# Patient Record
Sex: FEMALE | Race: BLACK OR AFRICAN AMERICAN | NOT HISPANIC OR LATINO | Employment: FULL TIME | URBAN - METROPOLITAN AREA
[De-identification: names, ages, dates, MRNs, and addresses within clinical notes are randomized per-mention and may not be internally consistent; named-entity substitution may affect disease eponyms.]

---

## 2017-05-16 ENCOUNTER — HOSPITAL ENCOUNTER (EMERGENCY)
Facility: CLINIC | Age: 22
Discharge: HOME | End: 2017-05-17
Attending: EMERGENCY MEDICINE | Admitting: EMERGENCY MEDICINE
Payer: COMMERCIAL

## 2017-05-16 VITALS
TEMPERATURE: 98.3 F | BODY MASS INDEX: 18.85 KG/M2 | DIASTOLIC BLOOD PRESSURE: 49 MMHG | HEIGHT: 60 IN | RESPIRATION RATE: 16 BRPM | WEIGHT: 96 LBS | OXYGEN SATURATION: 100 % | SYSTOLIC BLOOD PRESSURE: 96 MMHG | HEART RATE: 78 BPM

## 2017-05-16 DIAGNOSIS — Z3A.01 LESS THAN 8 WEEKS GESTATION OF PREGNANCY: ICD-10-CM

## 2017-05-16 DIAGNOSIS — O21.9 NAUSEA AND VOMITING DURING PREGNANCY: ICD-10-CM

## 2017-05-16 PROCEDURE — 96360 HYDRATION IV INFUSION INIT: CPT

## 2017-05-16 PROCEDURE — 99284 EMERGENCY DEPT VISIT MOD MDM: CPT | Mod: 25

## 2017-05-16 PROCEDURE — 80048 BASIC METABOLIC PNL TOTAL CA: CPT | Performed by: EMERGENCY MEDICINE

## 2017-05-16 PROCEDURE — 84702 CHORIONIC GONADOTROPIN TEST: CPT | Performed by: EMERGENCY MEDICINE

## 2017-05-16 NOTE — ED AVS SNAPSHOT
"  Emergency Department    [address] [facility] [address]    Phone:  [phone]    Fax:  [phone]                                       Anna Marie Knight   MRN: [id number]    Department:   Emergency Department   Date of Visit:  5/16/2017           Patient Information     Date Of Birth          1995        Your diagnoses for this visit were:     Less than 8 weeks gestation of pregnancy     Nausea and vomiting during pregnancy        You were seen by Gregorio Garcia MD.      Follow-up Information     Follow up with Your ob/gyn clinic In 2 days.    Why:  Thursday as scheduled        Follow up with  Emergency Department.    Specialty:  EMERGENCY MEDICINE    Why:  If symptoms worsen    Contact information:    [address] [facility] [address] [phone]        Discharge Instructions           How to Control Nausea and Vomiting     Taken before meals, medicines can help ease nausea.    Nausea is feeling that you need to throw up. Throwing up occurs when your body forces food that is in your stomach out through your mouth. Nausea and vomiting are symptoms that are caused by many things. They can happen when a condition or disease, medicine, medical treatment, or a poisonous substance affects the area in your brain that controls vomiting. Some conditions or diseases can cause nausea, abdominal pain or cramps, and vomiting. The symptoms can be mild and go away by themselves. Other symptoms can be serious. You will need to see your healthcare provider for these.  Nausea and vomiting are common. They can be caused by many things. These include:    \"Stomach flu\" (gastroenteritis)    Food poisoning    Stomach pain (gastritis)    Blockages  They can also be caused by a head injury, an infection in the brain or inside the ear, or migraines. Other common causes of nausea and vomiting include:    Brain tumor    Brain bruise    Motion sickness    Drugs. These include alcohol, " pain medicines such as morphine, and cancer medicines.    Toxins. These are poisonous things like plants or liquids that are swallowed by accident.    Advanced types of cancer    Movement problems (psychogenic problems)    Extra pressure in the fluid that surrounds the brain and spinal cord (elevated intracranial pressure)     Nausea and vomiting are also common side effects of chemotherapy and radiation therapy. Side effects happen when treatment changes some normal cells as well as cancer cells. In this case, the cells lining your stomach and the part of your brain that controls vomiting are affected. Other more serious causes of vomiting may be hard to find early in the illness.     When to seek medical advice  Call your healthcare provider right away if you have the following:    Nausea or vomiting that lasts 24 hours or more    Trouble keeping fluids down   Medicines can help  Nausea or vomiting can often be prevented or controlled with medicines (antiemetics). Your doctor may give you antiemetics before or after treatment if you are getting chemotherapy or other medical treatments that cause nausea or vomiting.  Eating tips    If you have medicines to control nausea, take them before meals as directed.    Avoid fatty or greasy foods while nauseated.    Eat small meals slowly throughout the day.    Ask someone to sit with you while you eat to keep you from thinking about feeling nauseated.    Eat foods at room temperature or colder to avoid strong smells.    Eat dry foods, such as toast, crackers, or pretzels. Also eat cool, light foods, such as applesauce, and bland foods, such as oatmeal or skinned chicken.   Other ways to feel better    Get a little fresh air. Take a short walk.    Talk to a friend, listen to music, or watch TV.    Take a few deep, slow breaths.    Eat by candlelight or in surroundings that you find relaxing.    Use a technique, such as guided imagery, to help you relax. Imagine yourself in  a beautiful, restful scene. Or daydream about the place you d most like to be.    0920-6497 [employer]. [address] [place] [address], [place] [address]. All rights reserved. This information is not intended as a substitute for professional medical care. Always follow your healthcare professional's instructions.        Adapting to Pregnancy: First Trimester  As your body adjusts, you may have to change or limit your daily activities. You ll need more rest. You may also need to use the energy you have more wisely.     Eat stomach-friendly foods like cottage cheese, crackers, or bread throughout the day.   Your changing body  Almost every part of your body is affected as you adapt to pregnancy. The uterus and cervix will begin to soften right away. You may not look very pregnant during the first 3 months. But you are likely to have some common signs of early pregnancy:    Nausea    Fatigue    Frequent urination    Mood swings    Bloating of the abdomen    Missed or light periods (first trimester bleeding)    Nipple or breast tenderness, breast swelling  It s not too late to start good habits  What matters most is protecting your baby from this moment on. If you smoke, drink alcohol, or use drugs, now is the time to stop. If you need help, talk with your healthcare provider.    Smoking increases the risk of  stillbirth or having a low-birth-weight baby. If you smoke, quit now.    Alcohol and drugs have been linked with miscarriage, birth defects, intellectual disability, and low birth weight. Do not drink alcohol or take drugs.  Tips to relieve nausea  Although nausea can happen at any time of the day, it may be worse in the morning. To help prevent nausea:    Eat small, light meals at frequent intervals.    Get up slowly. Eat a few unsalted crackers before you get out of bed.    Avoid smells that bother you.    Avoid spicy and fatty foods.    Eat an ice pop in your favorite flavor.    Get plenty of  rest.    Ask your healthcare provider about taking jeaneth or vitamin B6 for nausea and vomiting.    Talk with your healthcare provider if you take vitamins that upset your stomach.  Work concerns  The end of the first trimester is a good time to discuss working during pregnancy with your employer. Follow your healthcare provider s advice if your job requires you to stand for a long time, work with hazardous tools, or even sit at a desk all day. Your workspace, workload, or scheduled hours may need to be adjusted. Perhaps you can change body postures more often or take an extra break.  Advice for travel  Talk to your healthcare provider first, but the second trimester may be the best time for any travel. You may be advised to avoid certain trips while you re pregnant. Food and water can be concerns in developing countries. Travel by car is a good choice, as you can stop, get out, and stretch. Bring snacks and water along. Fasten the lap belt below your belly, low over your hips. Also be sure to wear the shoulder harness.  Intimacy  Unless your healthcare provider tells you to, there is no reason to stop having sex while you re pregnant. You or your partner may notice changes in desire. Desire may be less in the first trimester, due to nausea and fatigue. In the second trimester, sex may be very enjoyable. The third trimester can be a challenge comfort-wise. Try different positions and see what s best for you both.    5877-6505 [employer]. [address] [place] [address]. All rights reserved. This information is not intended as a substitute for professional medical care. Always follow your healthcare professional's instructions.          Pregnancy: Your First Trimester Changes  The first trimester is a time of rapid development for your baby. Because your baby is growing so quickly, it is important that you start a healthy lifestyle right away. By the end of the first trimester, your baby has  "formed all of its major body organs and weighs just over an ounce.     Actual size of baby is 1/4\"    Month 1 (Weeks 1 to 4)  The placenta (the organ that nourishes your baby) begins to form. The brain, spinal cord, heart, gastrointestinal tract, and lungs begin to develop. Your baby is about 1/4 inch long by the end of the first month.     Actual size of baby is 1\"    Month 2 (Weeks 5 to 8)  All of your baby s major body organs form. The face, fingers, toes, ears, and eyes appear. By the end of the month, your baby is about 1-inch long.     Actual size of baby is 4\"    Month 3 (Weeks 9 to 12)  Your baby can open and close its fists and mouth. The sexual organs begin to form. As the first trimester ends, your baby is about 3-inches long.    8348-3398 [employer]. [address] [place] [address]. All rights reserved. This information is not intended as a substitute for professional medical care. Always follow your healthcare professional's instructions.          24 Hour Appointment Hotline       To make an appointment at any [facility], call [phone] ([phone]). If you don't have a family doctor or clinic, we will help you find one. [facility] clinics are conveniently located to serve the needs of you and your family.             Review of your medicines      START taking        Dose / Directions Last dose taken    B-6 50 MG Tabs   Dose:  25 mg   Quantity:  15 tablet        Take 25 mg by mouth 3 times daily as needed   Refills:  0        doxylamine 25 MG Tabs tablet   Commonly known as:  UNISOM   Dose:  25 mg   Quantity:  15 each        Take 1 tablet (25 mg) by mouth 3 times daily as needed   Refills:  0                Prescriptions were sent or printed at these locations (2 Prescriptions)                   Other Prescriptions                Printed at Department/Unit printer (2 of 2)         doxylamine (UNISOM) 25 MG TABS tablet               Pyridoxine HCl (B-6) 50 MG TABS    "             Procedures and tests performed during your visit     Basic metabolic panel    HCG quantitative pregnancy (blood)    US OB < 14 Weeks Single      Orders Needing Specimen Collection     None      Pending Results     No orders found for last 3 day(s).            Pending Culture Results     No orders found for last 3 day(s).            Pending Results Instructions     If you had any lab results that were not finalized at the time of your Discharge, you can call the ED Lab Result RN at [phone]. You will be contacted by this team for any positive Lab results or changes in treatment. The nurses are available 7 days a week from 10A to 6:30P.  You can leave a message 24 hours per day and they will return your call.        Test Results From Your Hospital Stay        5/17/2017  1:27 AM      Component Results     Component Value Ref Range & Units Status    Sodium 136 133 - 144 mmol/L Final    Potassium 3.5 3.4 - 5.3 mmol/L Final    Chloride 102 94 - 109 mmol/L Final    Carbon Dioxide 21 20 - 32 mmol/L Final    Anion Gap 13 3 - 14 mmol/L Final    Glucose 70 70 - 99 mg/dL Final    Urea Nitrogen 7 7 - 30 mg/dL Final    Creatinine 0.44 (L) 0.52 - 1.04 mg/dL Final    GFR Estimate >90  Non  GFR Calc   >60 mL/min/1.7m2 Final    GFR Estimate If Black >90   GFR Calc   >60 mL/min/1.7m2 Final    Calcium 8.4 (L) 8.5 - 10.1 mg/dL Final         5/17/2017  1:46 AM      Component Results     Component Value Ref Range & Units Status    HCG Quantitative Serum 66952 (H) 0 - 5 IU/L Final               5/17/2017  2:48 AM      Narrative     US OB < 14 WEEKS SINGLE  5/17/2017 2:17 AM     INDICATION: Pelvic pain in pregnancy.    COMPARISON: None.    FINDINGS: Transabdominal ultrasound demonstrates an early live IUP  measuring 7 weeks and 2 days by crown-rump length. Fetal heart rate  143 bpm. The GILSON by crown-rump length is 1/1/2018. Ovaries are not  visualized. No free fluid or other acute findings.         Impression     IMPRESSION : Single early live IUP 7 weeks and 2 days. No abnormality  is seen.    YOEL BRAXTON MD                Clinical Quality Measure: Blood Pressure Screening     Your blood pressure was checked while you were in the emergency department today. The last reading we obtained was  BP: 96/49 . Please read the guidelines below about what these numbers mean and what you should do about them.  If your systolic blood pressure (the top number) is less than 120 and your diastolic blood pressure (the bottom number) is less than 80, then your blood pressure is normal. There is nothing more that you need to do about it.  If your systolic blood pressure (the top number) is 120-139 or your diastolic blood pressure (the bottom number) is 80-89, your blood pressure may be higher than it should be. You should have your blood pressure rechecked within a year by a primary care provider.  If your systolic blood pressure (the top number) is 140 or greater or your diastolic blood pressure (the bottom number) is 90 or greater, you may have high blood pressure. High blood pressure is treatable, but if left untreated over time it can put you at risk for heart attack, stroke, or kidney failure. You should have your blood pressure rechecked by a primary care provider within the next 4 weeks.  If your provider in the emergency department today gave you specific instructions to follow-up with your doctor or provider even sooner than that, you should follow that instruction and not wait for up to 4 weeks for your follow-up visit.        Thank you for choosing [facility]       Thank you for choosing [facility] for your care. Our goal is always to provide you with excellent care. Hearing back from our patients is one way we can continue to improve our services. Please take a few minutes to complete the written survey that you may receive in the mail after you visit with us. Thank you!        MyChart Information      "[employer] lets you send messages to your doctor, view your test results, renew your prescriptions, schedule appointments and more. To sign up, go to [email or web address] . Click on \"Log in\" on the left side of the screen, which will take you to the Welcome page. Then click on \"Sign up Now\" on the right side of the page.     You will be asked to enter the access code listed below, as well as some personal information. Please follow the directions to create your username and password.     Your access code is: [email or web address]  Expires: 8/15/2017  2:59 AM     Your access code will  in 90 days. If you need help or a new code, please call your [facility] clinic or [phone].        Care EveryWhere ID     This is your Care EveryWhere ID. This could be used by other organizations to access your [facility] medical records  [id number]        After Visit Summary       This is your record. Keep this with you and show to your community pharmacist(s) and doctor(s) at your next visit.                  "

## 2017-05-16 NOTE — ED AVS SNAPSHOT
Emergency Department    [address] [facility] [address]    Phone:  [phone]    Fax:  [phone]                                       Anna Marie Knight   MRN: [id number]    Department:   Emergency Department   Date of Visit:  5/16/2017           After Visit Summary Signature Page     I have received my discharge instructions, and my questions have been answered. I have discussed any challenges I see with this plan with the nurse or doctor.    ..........................................................................................................................................  Patient/Patient Representative Signature      ..........................................................................................................................................  Patient Representative Print Name and Relationship to Patient    ..................................................               ................................................  Date                                            Time    ..........................................................................................................................................  Reviewed by Signature/Title    ...................................................              ..............................................  Date                                                            Time

## 2017-05-17 ENCOUNTER — APPOINTMENT (OUTPATIENT)
Dept: ULTRASOUND IMAGING | Facility: CLINIC | Age: 22
End: 2017-05-17
Attending: EMERGENCY MEDICINE
Payer: COMMERCIAL

## 2017-05-17 LAB
ANION GAP SERPL CALCULATED.3IONS-SCNC: 13 MMOL/L (ref 3–14)
B-HCG SERPL-ACNC: ABNORMAL IU/L (ref 0–5)
BUN SERPL-MCNC: 7 MG/DL (ref 7–30)
CALCIUM SERPL-MCNC: 8.4 MG/DL (ref 8.5–10.1)
CHLORIDE SERPL-SCNC: 102 MMOL/L (ref 94–109)
CO2 SERPL-SCNC: 21 MMOL/L (ref 20–32)
CREAT SERPL-MCNC: 0.44 MG/DL (ref 0.52–1.04)
GFR SERPL CREATININE-BSD FRML MDRD: ABNORMAL ML/MIN/1.7M2
GLUCOSE SERPL-MCNC: 70 MG/DL (ref 70–99)
POTASSIUM SERPL-SCNC: 3.5 MMOL/L (ref 3.4–5.3)
SODIUM SERPL-SCNC: 136 MMOL/L (ref 133–144)

## 2017-05-17 PROCEDURE — 25000128 H RX IP 250 OP 636: Performed by: EMERGENCY MEDICINE

## 2017-05-17 PROCEDURE — 96360 HYDRATION IV INFUSION INIT: CPT

## 2017-05-17 PROCEDURE — 25000132 ZZH RX MED GY IP 250 OP 250 PS 637: Performed by: EMERGENCY MEDICINE

## 2017-05-17 PROCEDURE — 76801 OB US < 14 WKS SINGLE FETUS: CPT

## 2017-05-17 RX ORDER — PYRIDOXINE HCL (VITAMIN B6) 50 MG
25 TABLET ORAL 3 TIMES DAILY PRN
Qty: 15 TABLET | Refills: 0 | Status: SHIPPED | OUTPATIENT
Start: 2017-05-17 | End: 2023-03-01

## 2017-05-17 RX ORDER — PYRIDOXINE HCL (VITAMIN B6) 50 MG
50 TABLET ORAL ONCE
Status: COMPLETED | OUTPATIENT
Start: 2017-05-17 | End: 2017-05-17

## 2017-05-17 RX ADMIN — DOXYLAMINE SUCCINATE 25 MG: 25 TABLET ORAL at 00:34

## 2017-05-17 RX ADMIN — SODIUM CHLORIDE 1000 ML: 9 INJECTION, SOLUTION INTRAVENOUS at 00:04

## 2017-05-17 RX ADMIN — Medication 50 MG: at 00:34

## 2017-05-17 RX ADMIN — SODIUM CHLORIDE 1000 ML: 9 INJECTION, SOLUTION INTRAVENOUS at 00:33

## 2017-05-17 ASSESSMENT — ENCOUNTER SYMPTOMS
FEVER: 0
ABDOMINAL PAIN: 0
DIARRHEA: 0
LIGHT-HEADEDNESS: 0
COUGH: 0
HEMATURIA: 0
FLANK PAIN: 0
BLOOD IN STOOL: 0
DYSURIA: 0
CHILLS: 0
VOMITING: 1
NAUSEA: 1

## 2017-05-17 NOTE — ED NOTES
Pt was into urgent last Friday 5/12/17 to confirm that she was pregnant and for the nausea.. Pt vomiting, states she has not been able to drink anything today without throwing up today

## 2017-05-17 NOTE — ED PROVIDER NOTES
History     Chief Complaint:  Nausea & Vomiting    HPI   Anna Marie Knight is an otherwise healthy 22 year old G1 female currently 7 weeks 5 days in gestation by LNP (March 24th) who presents with nausea and vomiting. The patient reports that she woke up this morning with nausea and vomiting and has been unable to keep PO down since, prompting her to come to the ED for evaluation. On arrival to the ED, the patient reports multiple episodes of nonbloody emesis today. She states her nausea is improved in the ED. The patient reports she had a brief cramping feeling in her lower abdomen after urinating in the middle of the night but this has completely resolved. She denies any abdominal pain or cramping currently, diarrhea, dysuria, hematuria, vaginal bleeding, or abnormal vaginal discharge. The patient was feeling well yesterday. She denies any recent fever, cough, or cold. No coffee ground emesis. She has an appointment scheduled with OB in 2 days.     Allergies:  No Known Allergies     Medications:    The patient is not currently taking any prescribed medications.    Past Medical History:    History reviewed. No pertinent past medical history.    Past Surgical History:    History reviewed. No pertinent surgical history.    Family History:    History reviewed. No pertinent family history.     Social History:  Smoking status: No  Alcohol use: No  Presents to the ED with her significant other    Review of Systems   Constitutional: Negative for chills and fever.   Respiratory: Negative for cough.    Cardiovascular: Negative for chest pain.   Gastrointestinal: Positive for nausea and vomiting. Negative for abdominal pain, blood in stool and diarrhea.   Genitourinary: Negative for dysuria, flank pain, hematuria, pelvic pain, vaginal bleeding and vaginal discharge.   Neurological: Negative for light-headedness.   All other systems reviewed and are negative.      Physical Exam     Patient Vitals for the past 24 hrs:   BP Temp  Temp src Pulse Resp SpO2 Height Weight   05/16/17 2327 96/49 98.3  F (36.8  C) Oral 78 16 100 % 1.524 m (5') 43.5 kg (96 lb)     Physical Exam  Constitutional:  Appears well-developed and well-nourished. Cooperative.   HENT:   Head:    Atraumatic.   Mouth/Throat:   Oropharynx is without erythema or exudate and tacky oral mucosa.  Eyes:    Conjunctivae normal and EOM are normal.      Pupils are equal, round, and reactive to light.   Neck:    Normal range of motion. Neck supple.   Cardiovascular:  Normal rate, regular rhythm, normal heart sounds and radial and    dorsalis pedis pulses are 2+ and symmetric.    Pulmonary/Chest:  Effort normal and breath sounds normal.   Abdominal:   Soft. Bowel sounds are normal.      No splenomegaly or hepatomegaly. No tenderness. No rebound.   Musculoskeletal:  Normal range of motion. No edema and no tenderness.   Neurological:  Alert. Normal strength. No cranial nerve deficit.  Skin:    Skin is warm and dry.   Psychiatric:   Normal mood and affect.     Emergency Department Course   Imaging:  Radiographic findings were communicated with the patient who voiced understanding of the findings.    US OB <14 weeks single:  Single early live IUP 7 weeks and 2 days. No abnormality  is seen.  As read by Radiology.    Laboratory:  BMP: Creatinine 0.44(L), Calcium 8.4(L), o/w WNL  HCG quant: 91553(H)    Interventions:  NS 1L IV Bolus  NS 1L IV Bolus  0034: Pyridoxine 50 mg oral  0034: Doxylamine 25 mg oral     Emergency Department Course:  Past medical records, nursing notes, and vitals reviewed.  0012: I performed an exam of the patient and obtained history, as documented above.  IV inserted and blood drawn.  The patient was sent for a US OB while in the emergency department, findings above.    0240: I rechecked the patient. Explained findings to the patient.  The patient passed a PO challenge prior to discharge from the ED.    I rechecked the patient.  Findings and plan explained to the  Patient. Patient discharged home with instructions regarding supportive care, medications, and reasons to return. The importance of close follow-up was reviewed.     Impression & Plan      Medical Decision Making:  Anna Marie Knight is a 22 year old female who presents for evaluation of nausea and vomiting.  She is currently pregnant.  Nonetheless, I considered a broad differential diagnosis for this patient including viral gastroenteritis, food poisoning, bowel obstruction, intra-abdominal infection such as colitis, cholecystitis, UTI, pyelonephritis, appendicitis, etc.  There are no signs of worrisome intra-abdominal pathologies detected during the visit today.  The patient has a completely benign abdominal exam without rebound, guarding, or marked tenderness to palpation.  Specifically, she has no RLQ tenderness. I doubt ectopic pregnancy based on benign abdominal exam, and IUP seen on ultrasound.    Electrolytes are unremarkable.  She does not appear clinically dehydrated.  Supportive outpatient management is therefore indicated.  Abdominal pain precautions are given for home.    It was discussed with the patient to return to the ED for blood in stool, increasing pain, or fevers more than 102.   Feels much improved after interventions in ED.  I discussed strategies for dealing with nausea and vomiting of pregnancy.  I recommended that she try B6 and doxylamine as first line treatment in accordance with ACOG guidelines.  I believe all of her symptoms are at this point explained by the pregnancy and nausea and vomiting of pregnancy.         Diagnosis:    ICD-10-CM   1. Less than 8 weeks gestation of pregnancy Z3A.01   2. Nausea and vomiting during pregnancy O21.9     Disposition: Discharged to home    Discharge Medications:  New Prescriptions    DOXYLAMINE (UNISOM) 25 MG TABS TABLET    Take 1 tablet (25 mg) by mouth 3 times daily as needed    PYRIDOXINE HCL (B-6) 50 MG TABS    Take 25 mg by mouth 3 times daily as  toribio Contreras  5/16/2017    EMERGENCY DEPARTMENT    I, Debra Contreras, am serving as a scribe at 12:12 AM on 5/17/2017 to document services personally performed by Gregorio Garcia MD based on my observations and the provider's statements to me.        Gregorio Garcia MD  05/18/17 0872

## 2017-05-30 ENCOUNTER — HOSPITAL ENCOUNTER (EMERGENCY)
Facility: CLINIC | Age: 22
Discharge: HOME | End: 2017-05-30
Attending: EMERGENCY MEDICINE | Admitting: EMERGENCY MEDICINE
Payer: COMMERCIAL

## 2017-05-30 VITALS
TEMPERATURE: 98.4 F | HEIGHT: 60 IN | SYSTOLIC BLOOD PRESSURE: 92 MMHG | WEIGHT: 96 LBS | DIASTOLIC BLOOD PRESSURE: 59 MMHG | BODY MASS INDEX: 18.85 KG/M2 | RESPIRATION RATE: 16 BRPM | HEART RATE: 79 BPM | OXYGEN SATURATION: 99 %

## 2017-05-30 DIAGNOSIS — Z34.91 FIRST TRIMESTER PREGNANCY: ICD-10-CM

## 2017-05-30 DIAGNOSIS — G43.809 OTHER MIGRAINE WITHOUT STATUS MIGRAINOSUS, NOT INTRACTABLE: ICD-10-CM

## 2017-05-30 PROCEDURE — 25000128 H RX IP 250 OP 636: Performed by: EMERGENCY MEDICINE

## 2017-05-30 PROCEDURE — 99284 EMERGENCY DEPT VISIT MOD MDM: CPT | Mod: 25

## 2017-05-30 PROCEDURE — 96361 HYDRATE IV INFUSION ADD-ON: CPT

## 2017-05-30 PROCEDURE — 96374 THER/PROPH/DIAG INJ IV PUSH: CPT

## 2017-05-30 PROCEDURE — 96375 TX/PRO/DX INJ NEW DRUG ADDON: CPT

## 2017-05-30 RX ORDER — PRENATAL VIT/IRON FUM/FOLIC AC 27MG-0.8MG
1 TABLET ORAL DAILY
COMMUNITY
End: 2023-08-07

## 2017-05-30 RX ORDER — PROCHLORPERAZINE MALEATE 10 MG
10 TABLET ORAL EVERY 6 HOURS PRN
Qty: 10 TABLET | Refills: 1 | Status: SHIPPED | OUTPATIENT
Start: 2017-05-30 | End: 2023-03-01

## 2017-05-30 RX ORDER — DIPHENHYDRAMINE HYDROCHLORIDE 50 MG/ML
25 INJECTION INTRAMUSCULAR; INTRAVENOUS ONCE
Status: COMPLETED | OUTPATIENT
Start: 2017-05-30 | End: 2017-05-30

## 2017-05-30 RX ADMIN — DIPHENHYDRAMINE HYDROCHLORIDE 25 MG: 50 INJECTION, SOLUTION INTRAMUSCULAR; INTRAVENOUS at 01:43

## 2017-05-30 RX ADMIN — PROCHLORPERAZINE EDISYLATE 5 MG: 5 INJECTION INTRAMUSCULAR; INTRAVENOUS at 01:46

## 2017-05-30 RX ADMIN — SODIUM CHLORIDE 1000 ML: 9 INJECTION, SOLUTION INTRAVENOUS at 01:29

## 2017-05-30 NOTE — ED AVS SNAPSHOT
Emergency Department    [address] [place] [address]    Phone:  [phone]    Fax:  [phone]                                       Anna Marie Knight   MRN: [id number]    Department:   Emergency Department   Date of Visit:  5/30/2017           After Visit Summary Signature Page     I have received my discharge instructions, and my questions have been answered. I have discussed any challenges I see with this plan with the nurse or doctor.    ..........................................................................................................................................  Patient/Patient Representative Signature      ..........................................................................................................................................  Patient Representative Print Name and Relationship to Patient    ..................................................               ................................................  Date                                            Time    ..........................................................................................................................................  Reviewed by Signature/Title    ...................................................              ..............................................  Date                                                            Time

## 2017-05-30 NOTE — ED PROVIDER NOTES
CHIEF COMPLAINT:  Headache.      HISTORY OF PRESENT ILLNESS:  Anna Marie Knight is a 22-year-old female who says that she has a history of migraines over the last year which she usually treats with ibuprofen.  They come on when she is dehydrated or in the sun.  She is 9 weeks' pregnant, and has had poor p.o. intake due to nausea, and she has lost some weight.  This headache came on a few hours ago.  She says it feels like a typical migraine.  She denies any neurologic symptoms.  She denies any fever or neck pain.      The patient has suffered with nausea during the pregnancy, and has had a 10-pound weight loss.  She is followed by OB without any other complications at this point.      PAST MEDICAL HISTORY:  Negative.      HABITS:  Tobacco, negative.  Alcohol and drug us,e negative.      SOCIAL HISTORY:  She is here with a male family member named Vince.  She lives with her sister.  Works at [employer].      REVIEW OF SYSTEMS:  Limited due to her discomfort.      MEDICATIONS:  Vitamin B6, doxylamine.      ALLERGIES:  None.      PHYSICAL EXAM:   VITAL SIGNS:  Temperature is 98.4, blood pressure 104/60, pulse 79, respirations 16.   GENERAL:  The patient is lying on her side, complaining of photophobia, having her eyes closed.   HEENT:  TMs gray.  Pupils equal.  Pharynx normal.   NECK:  Supple.   CARDIOVASCULAR:  S1, S2.   LUNGS:  Clear to auscultation.   ABDOMEN:  Soft, nontender.   NEUROLOGIC:  Cranial nerves II-XII intact.  Motor 5/5.  Reflexes symmetric.  Reflexes and sensation intact.  Finger-to-nose intact.   PSYCHIATRIC:  Alert.      EMERGENCY DEPARTMENT COURSE:  The patient received 1 liter of normal saline, Compazine 5 IV, Benadryl 25 IV, and was feeling better.  She was able to sleep and feels she is able to go home.      ASSESSMENT:   1.  Migraine headache.   2.  First trimester of pregnancy with nausea and weight loss.      DISPOSITION:  Home.      DISCHARGE INSTRUCTIONS:   1.  Compazine 10 mg q. 6 hours, #10.    2.  Follow up with OB regarding above concerns.  Follow up sooner if worsens.         MARIELA PEREIRA MD             D: 2017 02:38   T: 2017 04:25   MT: TIANNA#[id number]      Name:     BRANDON MCGHEE   MRN:      [id number]        Account:      [id number]   :      1995           Visit Date:   2017      Document: [id number]       cc: [facility]

## 2017-05-30 NOTE — ED AVS SNAPSHOT
Emergency Department    [address] [place] [id number]    Phone:  [phone]    Fax:  [phone]                                       Anna Marie Knight   MRN: [id number]    Department:   Emergency Department   Date of Visit:  5/30/2017           Patient Information     Date Of Birth          1995        Your diagnoses for this visit were:     Other migraine without status migrainosus, not intractable     First trimester pregnancy        You were seen by Suri Eldridge MD.      Follow-up Information     Please follow up.    Why:  See your ob as scheduled, sooner if worse.         Discharge Instructions       Discharge Instructions  Headache    You were seen today for a headache. Headaches may be caused by many different things such as muscle tension, sinus inflammation, anxiety and stress, having too little sleep, too much alcohol, some medical conditions or injury. You may have a migraine, which is caused by changes in the blood vessels in your head.  At this time your doctor does not find that your headache is a sign of anything dangerous or life-threatening.  However, sometimes the signs of serious illness do not show up right away.  If you have new or worse symptoms, you may need to be seen again in the emergency department or by your primary doctor.      Return to the Emergency Department if:    You get a fever of 101 F or higher.    Your headache gets much worse.    You get a stiff neck with your headache.    You get a new headache that is different or worse than headaches you have had before.    You are vomiting and can t keep food or water down.    You have blurry or double vision or other problems with your eyes.    You have a new weakness on one side of your body.    You have difficulty with balance which is new.    You or your family thinks you are confused.    You have a seizure or convulsion.    What can I do to help myself?    Pain medications - You may take a pain  medication such as Tylenol  (acetaminophen), Advil , Nuprin  (ibuprofen) or Aleve  (naproxen).  If you have been given a narcotic such as Vicodin  (hydrocodone with acetaminophen), Percocet  (oxycodone with acetaminophen), codeine, or a muscle relaxant such as Flexeril  (cyclobenzaprine) or Soma  (carisoprodol), do not drive for four hours after you have taken it. If the narcotic contains Tylenol  (acetaminophen), do not take Tylenol  with it. All narcotics will cause constipation, so eat a high fiber diet.        Take a pain reliever as soon as you notice symptoms.  Starting medications as soon as you start to have symptoms may lessen the amount of pain you have.    Relaxing in a quiet, dark room may help.    Get enough sleep and eat meals regularly.    Schedule an appointment with your primary physician as instructed, or at least within 1 week.    You may need to watch for certain foods or other things which may trigger your headaches.  Keeping a journal of your headaches and possible triggers may help you and your primary doctor to identify things which you should avoid which may be causing your headaches.  If you were given a prescription for medicine here today, be sure to read all of the information (including the package insert) that comes with your prescription.  This will include important information about the medicine, its side effects, and any warnings that you need to know about.  The pharmacist who fills the prescription can provide more information and answer questions you may have about the medicine.  If you have questions or concerns that the pharmacist cannot address, please call or return to the Emergency Department.   Opioid Medication Information    Pain medications are among the most commonly prescribed medicines, so we are including this information for all our patients. If you did not receive pain medication or get a prescription for pain medicine, you can ignore it.     You may have been  given a prescription for an opioid (narcotic) pain medicine and/or have received a pain medicine while here in the Emergency Department. These medicines can make you drowsy or impaired. You must not drive, operate dangerous equipment, or engage in any other dangerous activities while taking these medications. If you drive while taking these medications, you could be arrested for DUI, or driving under the influence. Do not drink any alcohol while you are taking these medications.     Opioid pain medications can cause addiction. If you have a history of chemical dependency of any type, you are at a higher risk of becoming addicted to pain medications.  Only take these prescribed medications to treat your pain when all other options have been tried. Take it for as short a time and as few doses as possible. Store your pain pills in a secure place, as they are frequently stolen and provide a dangerous opportunity for children or visitors in your house to start abusing these powerful medications. We will not replace any lost or stolen medicine.  As soon as your pain is better, you should flush all your remaining medication.     Many prescription pain medications contain Tylenol  (acetaminophen), including Vicodin , Tylenol #3 , Norco , Lortab , and Percocet .  You should not take any extra pills of Tylenol  if you are using these prescription medications or you can get very sick.  Do not ever take more than 3000 mg of acetaminophen in any 24 hour period.    All opioids tend to cause constipation. Drink plenty of water and eat foods that have a lot of fiber, such as fruits, vegetables, prune juice, apple juice and high fiber cereal.  Take a laxative if you don t move your bowels at least every other day. Miralax , Milk of Magnesia, Colace , or Senna  can be used to keep you regular.      Remember that you can always come back to the Emergency Department if you are not able to see your regular doctor in the amount of time  listed above, if you get any new symptoms, or if there is anything that worries you.        24 Hour Appointment Hotline       To make an appointment at any [facility], call [phone] ([phone]). If you don't have a family doctor or clinic, we will help you find one. [facility] are conveniently located to serve the needs of you and your family.             Review of your medicines      START taking        Dose / Directions Last dose taken    prochlorperazine 10 MG tablet   Commonly known as:  COMPAZINE   Dose:  10 mg   Quantity:  10 tablet        Take 1 tablet (10 mg) by mouth every 6 hours as needed for nausea or vomiting   Refills:  1          Our records show that you are taking the medicines listed below. If these are incorrect, please call your family doctor or clinic.        Dose / Directions Last dose taken    B-6 50 MG Tabs   Dose:  25 mg   Quantity:  15 tablet        Take 25 mg by mouth 3 times daily as needed   Refills:  0        doxylamine 25 MG Tabs tablet   Commonly known as:  UNISOM   Dose:  25 mg   Quantity:  15 each        Take 1 tablet (25 mg) by mouth 3 times daily as needed   Refills:  0        prenatal multivitamin  plus iron 27-0.8 MG Tabs per tablet   Dose:  1 tablet        Take 1 tablet by mouth daily   Refills:  0                Prescriptions were sent or printed at these locations (1 Prescription)                   Other Prescriptions                Printed at Department/Unit printer (1 of 1)         prochlorperazine (COMPAZINE) 10 MG tablet                Orders Needing Specimen Collection     None      Pending Results     No orders found from 5/28/2017 to 5/31/2017.            Pending Culture Results     No orders found from 5/28/2017 to 5/31/2017.            Pending Results Instructions     If you had any lab results that were not finalized at the time of your Discharge, you can call the ED Lab Result RN at [phone]. You will be contacted by this team for any  positive Lab results or changes in treatment. The nurses are available 7 days a week from 10A to 6:30P.  You can leave a message 24 hours per day and they will return your call.        Test Results From Your Hospital Stay               Clinical Quality Measure: Blood Pressure Screening     Your blood pressure was checked while you were in the emergency department today. The last reading we obtained was  BP: 104/63 . Please read the guidelines below about what these numbers mean and what you should do about them.  If your systolic blood pressure (the top number) is less than 120 and your diastolic blood pressure (the bottom number) is less than 80, then your blood pressure is normal. There is nothing more that you need to do about it.  If your systolic blood pressure (the top number) is 120-139 or your diastolic blood pressure (the bottom number) is 80-89, your blood pressure may be higher than it should be. You should have your blood pressure rechecked within a year by a primary care provider.  If your systolic blood pressure (the top number) is 140 or greater or your diastolic blood pressure (the bottom number) is 90 or greater, you may have high blood pressure. High blood pressure is treatable, but if left untreated over time it can put you at risk for heart attack, stroke, or kidney failure. You should have your blood pressure rechecked by a primary care provider within the next 4 weeks.  If your provider in the emergency department today gave you specific instructions to follow-up with your doctor or provider even sooner than that, you should follow that instruction and not wait for up to 4 weeks for your follow-up visit.        Thank you for choosing [facility]       Thank you for choosing [facility] for your care. Our goal is always to provide you with excellent care. Hearing back from our patients is one way we can continue to improve our services. Please take a few minutes to complete the written survey that you  "may receive in the mail after you visit with us. Thank you!        [employer] Information     [employer] lets you send messages to your doctor, view your test results, renew your prescriptions, schedule appointments and more. To sign up, go to [email or web address] . Click on \"Log in\" on the left side of the screen, which will take you to the Welcome page. Then click on \"Sign up Now\" on the right side of the page.     You will be asked to enter the access code listed below, as well as some personal information. Please follow the directions to create your username and password.     Your access code is: [email or web address]  Expires: 8/15/2017  2:59 AM     Your access code will  in 90 days. If you need help or a new code, please call your [place] clinic or [phone].        Care EveryWhere ID     This is your Care EveryWhere ID. This could be used by other organizations to access your [facility] medical records  [id number]        After Visit Summary       This is your record. Keep this with you and show to your community pharmacist(s) and doctor(s) at your next visit.                  "

## 2022-09-16 ENCOUNTER — OFFICE VISIT (OUTPATIENT)
Dept: URGENT CARE | Facility: URGENT CARE | Age: 27
End: 2022-09-16
Payer: COMMERCIAL

## 2022-09-16 VITALS
SYSTOLIC BLOOD PRESSURE: 95 MMHG | DIASTOLIC BLOOD PRESSURE: 69 MMHG | WEIGHT: 117.8 LBS | OXYGEN SATURATION: 100 % | HEART RATE: 72 BPM | TEMPERATURE: 98.7 F

## 2022-09-16 DIAGNOSIS — J03.90 TONSILLITIS: ICD-10-CM

## 2022-09-16 DIAGNOSIS — J02.9 SORE THROAT: Primary | ICD-10-CM

## 2022-09-16 LAB
DEPRECATED S PYO AG THROAT QL EIA: NEGATIVE
GROUP A STREP BY PCR: NOT DETECTED

## 2022-09-16 PROCEDURE — 99203 OFFICE O/P NEW LOW 30 MIN: CPT | Performed by: PHYSICIAN ASSISTANT

## 2022-09-16 PROCEDURE — 87651 STREP A DNA AMP PROBE: CPT | Performed by: PHYSICIAN ASSISTANT

## 2022-09-16 RX ORDER — DEXAMETHASONE SODIUM PHOSPHATE 4 MG/ML
10 VIAL (ML) INJECTION ONCE
Status: COMPLETED | OUTPATIENT
Start: 2022-09-16 | End: 2022-09-16

## 2022-09-16 RX ADMIN — Medication 10 MG: at 16:10

## 2022-09-16 ASSESSMENT — ENCOUNTER SYMPTOMS
ENDOCRINE NEGATIVE: 1
NECK PAIN: 0
DIARRHEA: 0
CHILLS: 0
SORE THROAT: 1
RHINORRHEA: 0
MUSCULOSKELETAL NEGATIVE: 1
PALPITATIONS: 0
ALLERGIC/IMMUNOLOGIC NEGATIVE: 1
NAUSEA: 0
HEADACHES: 0
COUGH: 0
NECK STIFFNESS: 0
BACK PAIN: 0
FEVER: 0
LIGHT-HEADEDNESS: 0
RESPIRATORY NEGATIVE: 1
CARDIOVASCULAR NEGATIVE: 1
SHORTNESS OF BREATH: 0
VOMITING: 0
WOUND: 0
JOINT SWELLING: 0
ARTHRALGIAS: 0
MYALGIAS: 0
DIZZINESS: 0
EYES NEGATIVE: 1
WEAKNESS: 0

## 2022-09-16 ASSESSMENT — PAIN SCALES - GENERAL: PAINLEVEL: WORST PAIN (10)

## 2022-09-16 NOTE — PROGRESS NOTES
Chief Complaint:     Chief Complaint   Patient presents with     Pharyngitis     Having a hard time talking or swallowing or talking on the left side of throat for three days. Yesterday was a little better than today.       Results for orders placed or performed in visit on 09/16/22   Streptococcus A Rapid Screen w/Reflex to PCR - Clinic Collect     Status: Normal    Specimen: Throat; Swab   Result Value Ref Range    Group A Strep antigen Negative Negative       Medical Decision Making:    Vital signs reviewed by Bernard Robins PA-C  BP 95/69 (BP Location: Left arm)   Pulse 72   Temp 98.7  F (37.1  C) (Oral)   Wt 53.4 kg (117 lb 12.8 oz)   SpO2 100%     Differential Diagnosis:  URI Adult/Peds:  Sinusitis, Strep pharyngitis, Tonsilitis, Viral pharyngitis, Viral syndrome and Viral upper respiratory illness        ASSESSMENT    1. Sore throat    2. Tonsillitis        PLAN    Patient is in no acute distress.    Temp is 98.7 in clinic today, lung sounds were clear, and O2 sats at 100% on RA.    RST was negative.  We will call with PCR results only if positive.  With symptoms, Rx for Augmentin sent in.  Patient given 10 Mg Decadron PO in clinic today.  Rest, Push fluids, vaporizer.  Ibuprofen and or Tylenol for any fever or body aches.  If symptoms worsen, recheck immediately otherwise follow up with your PCP in 1 week if symptoms are not improving.  Worrisome symptoms discussed with instructions to go to the ED.  Patient verbalized understanding and agreed with this plan.    Labs:    Results for orders placed or performed in visit on 09/16/22   Streptococcus A Rapid Screen w/Reflex to PCR - Clinic Collect     Status: Normal    Specimen: Throat; Swab   Result Value Ref Range    Group A Strep antigen Negative Negative        Vital signs reviewed by Bernard Robins PA-C  BP 95/69 (BP Location: Left arm)   Pulse 72   Temp 98.7  F (37.1  C) (Oral)   Wt 53.4 kg (117 lb 12.8 oz)   SpO2 100%     Current  Meds      Current Outpatient Medications:      amoxicillin-clavulanate (AUGMENTIN) 875-125 MG tablet, Take 1 tablet by mouth 2 times daily for 10 days, Disp: 20 tablet, Rfl: 0    Current Facility-Administered Medications:      dexamethasone (DECADRON) injectable solution used ORALLY 10 mg, 10 mg, Oral, Once, Bernard Robins PA-C      Respiratory History    occasional episodes of bronchitis      SUBJECTIVE    HPI: Anna Marie Knight is an 27 year old female who presents with sore throat.  Symptoms began 1 days ago and has unchanged.  There is no shortness of breath, wheezing and chest pain.  Patient is eating and drinking well.  No fever, nausea, vomiting, or diarrhea.    Patient denies any recent travel or exposure to known COVID positive tested individual.      Patient is new to [facility].    ROS:     Review of Systems   Constitutional: Negative for chills and fever.   HENT: Positive for sore throat. Negative for congestion, ear pain and rhinorrhea.    Eyes: Negative.    Respiratory: Negative.  Negative for cough and shortness of breath.    Cardiovascular: Negative.  Negative for chest pain and palpitations.   Gastrointestinal: Negative for diarrhea, nausea and vomiting.   Endocrine: Negative.    Genitourinary: Negative.    Musculoskeletal: Negative.  Negative for arthralgias, back pain, joint swelling, myalgias, neck pain and neck stiffness.   Skin: Negative.  Negative for rash and wound.   Allergic/Immunologic: Negative.  Negative for immunocompromised state.   Neurological: Negative for dizziness, weakness, light-headedness and headaches.         Family History   No family history on file.     Problem history  There is no problem list on file for this patient.       Allergies  No Known Allergies     Social History  Social History     Socioeconomic History     Marital status: Single     Spouse name: Not on file     Number of children: Not on file     Years of education: Not on file     Highest education  level: Not on file   Occupational History     Not on file   Tobacco Use     Smoking status: Not on file     Smokeless tobacco: Not on file   Substance and Sexual Activity     Alcohol use: Not on file     Drug use: Not on file     Sexual activity: Not on file   Other Topics Concern     Not on file   Social History Narrative     Not on file     Social Determinants of Health     Financial Resource Strain: Not on file   Food Insecurity: Not on file   Transportation Needs: Not on file   Physical Activity: Not on file   Stress: Not on file   Social Connections: Not on file   Intimate Partner Violence: Not on file   Housing Stability: Not on file        OBJECTIVE     Vital signs reviewed by Bernard Robins PA-C  BP 95/69 (BP Location: Left arm)   Pulse 72   Temp 98.7  F (37.1  C) (Oral)   Wt 53.4 kg (117 lb 12.8 oz)   SpO2 100%      Physical Exam  Vitals and nursing note reviewed.   Constitutional:       General: She is not in acute distress.     Appearance: She is well-developed. She is not ill-appearing, toxic-appearing or diaphoretic.   HENT:      Head: Normocephalic and atraumatic.      Right Ear: Hearing, tympanic membrane, ear canal and external ear normal. Tympanic membrane is not perforated, erythematous, retracted or bulging.      Left Ear: Hearing, tympanic membrane, ear canal and external ear normal. Tympanic membrane is not perforated, erythematous, retracted or bulging.      Nose: Congestion present. No mucosal edema or rhinorrhea.      Right Sinus: No maxillary sinus tenderness or frontal sinus tenderness.      Left Sinus: No maxillary sinus tenderness or frontal sinus tenderness.      Mouth/Throat:      Pharynx: Posterior oropharyngeal erythema present. No pharyngeal swelling, oropharyngeal exudate or uvula swelling.      Tonsils: Tonsillar exudate present. No tonsillar abscesses. 1+ on the right. 3+ on the left.   Eyes:      General:         Right eye: No discharge.         Left eye: No discharge.       Pupils: Pupils are equal, round, and reactive to light.   Cardiovascular:      Rate and Rhythm: Normal rate and regular rhythm.      Heart sounds: Normal heart sounds. No murmur heard.    No friction rub. No gallop.   Pulmonary:      Effort: Pulmonary effort is normal. No respiratory distress.      Breath sounds: Normal breath sounds. No decreased breath sounds, wheezing, rhonchi or rales.   Chest:      Chest wall: No tenderness.   Abdominal:      General: Bowel sounds are normal. There is no distension.      Palpations: Abdomen is soft. There is no mass.      Tenderness: There is no abdominal tenderness. There is no guarding.   Musculoskeletal:      Cervical back: Normal range of motion and neck supple.   Lymphadenopathy:      Head:      Right side of head: No submental, submandibular, tonsillar, preauricular or posterior auricular adenopathy.      Left side of head: No submental, submandibular, tonsillar, preauricular or posterior auricular adenopathy.      Cervical: No cervical adenopathy.      Right cervical: No superficial or posterior cervical adenopathy.     Left cervical: No superficial or posterior cervical adenopathy.   Skin:     General: Skin is warm and dry.      Findings: No rash.   Neurological:      Mental Status: She is alert and oriented to person, place, and time.      Cranial Nerves: No cranial nerve deficit.      Deep Tendon Reflexes: Reflexes are normal and symmetric.   Psychiatric:         Behavior: Behavior normal. Behavior is cooperative.         Thought Content: Thought content normal.         Judgment: Judgment normal.           Bernard Robins PA-C  9/16/2022, 3:32 PM

## 2022-09-16 NOTE — NURSING NOTE
Clinic Administered Medication Documentation    Administrations This Visit     dexamethasone (DECADRON) injectable solution used ORALLY 10 mg     Admin Date  09/16/2022 Action  Given Dose  10 mg Route  Oral Site   Administered By  Martha Valentine    Ordering Provider: Bernard Robins PA-C    Patient Supplied?: No                Oral Medication Documentation    Patient was given Decadron. Prior to medication administration, verified patients identity using patient s name and date of birth. Please see MAR and medication order for additional information.     Was entire amount of medication used? Yes  Expiration Date: 12/31/23

## 2023-03-01 ENCOUNTER — OFFICE VISIT (OUTPATIENT)
Dept: FAMILY MEDICINE | Facility: CLINIC | Age: 28
End: 2023-03-01
Payer: COMMERCIAL

## 2023-03-01 VITALS
RESPIRATION RATE: 14 BRPM | DIASTOLIC BLOOD PRESSURE: 73 MMHG | SYSTOLIC BLOOD PRESSURE: 107 MMHG | WEIGHT: 117 LBS | HEART RATE: 114 BPM | BODY MASS INDEX: 22.97 KG/M2 | HEIGHT: 60 IN | TEMPERATURE: 98.4 F | OXYGEN SATURATION: 99 %

## 2023-03-01 DIAGNOSIS — N76.0 BV (BACTERIAL VAGINOSIS): ICD-10-CM

## 2023-03-01 DIAGNOSIS — Z11.4 SCREENING FOR HIV (HUMAN IMMUNODEFICIENCY VIRUS): ICD-10-CM

## 2023-03-01 DIAGNOSIS — Z11.3 SCREEN FOR STD (SEXUALLY TRANSMITTED DISEASE): ICD-10-CM

## 2023-03-01 DIAGNOSIS — Z86.2 HISTORY OF ANEMIA: ICD-10-CM

## 2023-03-01 DIAGNOSIS — B96.89 BV (BACTERIAL VAGINOSIS): ICD-10-CM

## 2023-03-01 DIAGNOSIS — N30.00 ACUTE CYSTITIS WITHOUT HEMATURIA: ICD-10-CM

## 2023-03-01 DIAGNOSIS — Z12.4 CERVICAL CANCER SCREENING: ICD-10-CM

## 2023-03-01 DIAGNOSIS — E55.9 VITAMIN D DEFICIENCY: ICD-10-CM

## 2023-03-01 DIAGNOSIS — F10.10 ALCOHOL ABUSE, EPISODIC DRINKING BEHAVIOR: ICD-10-CM

## 2023-03-01 DIAGNOSIS — Z00.00 ROUTINE PHYSICAL EXAMINATION: Primary | ICD-10-CM

## 2023-03-01 DIAGNOSIS — F33.2 SEVERE EPISODE OF RECURRENT MAJOR DEPRESSIVE DISORDER, WITHOUT PSYCHOTIC FEATURES (H): ICD-10-CM

## 2023-03-01 DIAGNOSIS — Z11.59 NEED FOR HEPATITIS C SCREENING TEST: ICD-10-CM

## 2023-03-01 PROBLEM — G89.29 CHRONIC LEFT SHOULDER PAIN: Status: ACTIVE | Noted: 2018-05-18

## 2023-03-01 PROBLEM — M25.512 CHRONIC LEFT SHOULDER PAIN: Status: ACTIVE | Noted: 2018-05-18

## 2023-03-01 LAB
ALBUMIN SERPL-MCNC: 3.6 G/DL (ref 3.4–5)
ALBUMIN UR-MCNC: NEGATIVE MG/DL
ALP SERPL-CCNC: 62 U/L (ref 40–150)
ALT SERPL W P-5'-P-CCNC: 25 U/L (ref 0–50)
ANION GAP SERPL CALCULATED.3IONS-SCNC: 2 MMOL/L (ref 3–14)
APPEARANCE UR: ABNORMAL
AST SERPL W P-5'-P-CCNC: 25 U/L (ref 0–45)
BACTERIA #/AREA URNS HPF: ABNORMAL /HPF
BILIRUB SERPL-MCNC: 0.3 MG/DL (ref 0.2–1.3)
BILIRUB UR QL STRIP: NEGATIVE
BUN SERPL-MCNC: 9 MG/DL (ref 7–30)
CALCIUM SERPL-MCNC: 8.9 MG/DL (ref 8.5–10.1)
CHLORIDE BLD-SCNC: 107 MMOL/L (ref 94–109)
CLUE CELLS: PRESENT
CO2 SERPL-SCNC: 29 MMOL/L (ref 20–32)
COLOR UR AUTO: YELLOW
CREAT SERPL-MCNC: 0.62 MG/DL (ref 0.52–1.04)
ERYTHROCYTE [DISTWIDTH] IN BLOOD BY AUTOMATED COUNT: 12.4 % (ref 10–15)
FERRITIN SERPL-MCNC: 58 NG/ML (ref 12–150)
GFR SERPL CREATININE-BSD FRML MDRD: >90 ML/MIN/1.73M2
GLUCOSE BLD-MCNC: 92 MG/DL (ref 70–99)
GLUCOSE UR STRIP-MCNC: NEGATIVE MG/DL
HCT VFR BLD AUTO: 36.4 % (ref 35–47)
HGB BLD-MCNC: 12 G/DL (ref 11.7–15.7)
HGB UR QL STRIP: NEGATIVE
KETONES UR STRIP-MCNC: NEGATIVE MG/DL
LEUKOCYTE ESTERASE UR QL STRIP: ABNORMAL
MCH RBC QN AUTO: 29.8 PG (ref 26.5–33)
MCHC RBC AUTO-ENTMCNC: 33 G/DL (ref 31.5–36.5)
MCV RBC AUTO: 90 FL (ref 78–100)
NITRATE UR QL: NEGATIVE
PH UR STRIP: 7 [PH] (ref 5–7)
PLATELET # BLD AUTO: 324 10E3/UL (ref 150–450)
POTASSIUM BLD-SCNC: 4.6 MMOL/L (ref 3.4–5.3)
PROT SERPL-MCNC: 8.5 G/DL (ref 6.8–8.8)
RBC # BLD AUTO: 4.03 10E6/UL (ref 3.8–5.2)
RBC #/AREA URNS AUTO: ABNORMAL /HPF
SODIUM SERPL-SCNC: 138 MMOL/L (ref 133–144)
SP GR UR STRIP: 1.01 (ref 1–1.03)
SQUAMOUS #/AREA URNS AUTO: ABNORMAL /LPF
TRICHOMONAS, WET PREP: ABNORMAL
TSH SERPL DL<=0.005 MIU/L-ACNC: 2.9 MU/L (ref 0.4–4)
UROBILINOGEN UR STRIP-ACNC: 0.2 E.U./DL
WBC # BLD AUTO: 4.4 10E3/UL (ref 4–11)
WBC #/AREA URNS AUTO: ABNORMAL /HPF
WBC'S/HIGH POWER FIELD, WET PREP: ABNORMAL
YEAST, WET PREP: ABNORMAL

## 2023-03-01 PROCEDURE — G0145 SCR C/V CYTO,THINLAYER,RESCR: HCPCS | Performed by: NURSE PRACTITIONER

## 2023-03-01 PROCEDURE — 82977 ASSAY OF GGT: CPT | Performed by: NURSE PRACTITIONER

## 2023-03-01 PROCEDURE — 36415 COLL VENOUS BLD VENIPUNCTURE: CPT | Performed by: NURSE PRACTITIONER

## 2023-03-01 PROCEDURE — 86780 TREPONEMA PALLIDUM: CPT | Performed by: NURSE PRACTITIONER

## 2023-03-01 PROCEDURE — 87210 SMEAR WET MOUNT SALINE/INK: CPT | Performed by: NURSE PRACTITIONER

## 2023-03-01 PROCEDURE — 96127 BRIEF EMOTIONAL/BEHAV ASSMT: CPT | Performed by: NURSE PRACTITIONER

## 2023-03-01 PROCEDURE — 87086 URINE CULTURE/COLONY COUNT: CPT | Performed by: NURSE PRACTITIONER

## 2023-03-01 PROCEDURE — 87389 HIV-1 AG W/HIV-1&-2 AB AG IA: CPT | Performed by: NURSE PRACTITIONER

## 2023-03-01 PROCEDURE — 87340 HEPATITIS B SURFACE AG IA: CPT | Performed by: NURSE PRACTITIONER

## 2023-03-01 PROCEDURE — 82728 ASSAY OF FERRITIN: CPT | Performed by: NURSE PRACTITIONER

## 2023-03-01 PROCEDURE — 85027 COMPLETE CBC AUTOMATED: CPT | Performed by: NURSE PRACTITIONER

## 2023-03-01 PROCEDURE — 80053 COMPREHEN METABOLIC PANEL: CPT | Performed by: NURSE PRACTITIONER

## 2023-03-01 PROCEDURE — 87491 CHLMYD TRACH DNA AMP PROBE: CPT | Performed by: NURSE PRACTITIONER

## 2023-03-01 PROCEDURE — 86803 HEPATITIS C AB TEST: CPT | Performed by: NURSE PRACTITIONER

## 2023-03-01 PROCEDURE — 81001 URINALYSIS AUTO W/SCOPE: CPT | Performed by: NURSE PRACTITIONER

## 2023-03-01 PROCEDURE — 82306 VITAMIN D 25 HYDROXY: CPT | Performed by: NURSE PRACTITIONER

## 2023-03-01 PROCEDURE — 84443 ASSAY THYROID STIM HORMONE: CPT | Performed by: NURSE PRACTITIONER

## 2023-03-01 PROCEDURE — 87591 N.GONORRHOEAE DNA AMP PROB: CPT | Performed by: NURSE PRACTITIONER

## 2023-03-01 PROCEDURE — 99214 OFFICE O/P EST MOD 30 MIN: CPT | Performed by: NURSE PRACTITIONER

## 2023-03-01 RX ORDER — METRONIDAZOLE 500 MG/1
500 TABLET ORAL 2 TIMES DAILY
Qty: 14 TABLET | Refills: 0 | Status: SHIPPED | OUTPATIENT
Start: 2023-03-01 | End: 2023-03-08

## 2023-03-01 RX ORDER — NITROFURANTOIN 25; 75 MG/1; MG/1
100 CAPSULE ORAL 2 TIMES DAILY
Qty: 14 CAPSULE | Refills: 0 | Status: SHIPPED | OUTPATIENT
Start: 2023-03-01 | End: 2023-03-08

## 2023-03-01 RX ORDER — HYDROXYZINE HYDROCHLORIDE 25 MG/1
25 TABLET, FILM COATED ORAL 3 TIMES DAILY PRN
Qty: 30 TABLET | Refills: 1 | Status: SHIPPED | OUTPATIENT
Start: 2023-03-01

## 2023-03-01 RX ORDER — ESCITALOPRAM OXALATE 20 MG/1
1 TABLET ORAL
COMMUNITY
Start: 2023-02-08 | End: 2023-06-06

## 2023-03-01 ASSESSMENT — PAIN SCALES - GENERAL: PAINLEVEL: NO PAIN (0)

## 2023-03-01 ASSESSMENT — ANXIETY QUESTIONNAIRES
4. TROUBLE RELAXING: MORE THAN HALF THE DAYS
5. BEING SO RESTLESS THAT IT IS HARD TO SIT STILL: MORE THAN HALF THE DAYS
IF YOU CHECKED OFF ANY PROBLEMS ON THIS QUESTIONNAIRE, HOW DIFFICULT HAVE THESE PROBLEMS MADE IT FOR YOU TO DO YOUR WORK, TAKE CARE OF THINGS AT HOME, OR GET ALONG WITH OTHER PEOPLE: EXTREMELY DIFFICULT
8. IF YOU CHECKED OFF ANY PROBLEMS, HOW DIFFICULT HAVE THESE MADE IT FOR YOU TO DO YOUR WORK, TAKE CARE OF THINGS AT HOME, OR GET ALONG WITH OTHER PEOPLE?: EXTREMELY DIFFICULT
GAD7 TOTAL SCORE: 19
1. FEELING NERVOUS, ANXIOUS, OR ON EDGE: NEARLY EVERY DAY
GAD7 TOTAL SCORE: 19
7. FEELING AFRAID AS IF SOMETHING AWFUL MIGHT HAPPEN: NEARLY EVERY DAY
7. FEELING AFRAID AS IF SOMETHING AWFUL MIGHT HAPPEN: NEARLY EVERY DAY
GAD7 TOTAL SCORE: 19
6. BECOMING EASILY ANNOYED OR IRRITABLE: NEARLY EVERY DAY
2. NOT BEING ABLE TO STOP OR CONTROL WORRYING: NEARLY EVERY DAY
3. WORRYING TOO MUCH ABOUT DIFFERENT THINGS: NEARLY EVERY DAY

## 2023-03-01 ASSESSMENT — PATIENT HEALTH QUESTIONNAIRE - PHQ9
SUM OF ALL RESPONSES TO PHQ QUESTIONS 1-9: 24
SUM OF ALL RESPONSES TO PHQ QUESTIONS 1-9: 24
10. IF YOU CHECKED OFF ANY PROBLEMS, HOW DIFFICULT HAVE THESE PROBLEMS MADE IT FOR YOU TO DO YOUR WORK, TAKE CARE OF THINGS AT HOME, OR GET ALONG WITH OTHER PEOPLE: EXTREMELY DIFFICULT
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
## 2023-03-01 NOTE — PATIENT INSTRUCTIONS
At [facility], we strive to deliver an exceptional experience to you, every time we see you. If you receive a survey, please complete it as we do value your feedback.  If you have MyChart, you can expect to receive results automatically within 24 hours of their completion.  Your provider will send a note interpreting your results as well.   If you do not have MyChart, you should receive your results in about a week by mail.    Your care team:                            Family Medicine Internal Medicine   MD Lavell Willis MD Shantel Branch-Fleming, MD Srinivasa Vaka, MD Katya Belousova, ANGIE JohnsonHillCELIO Cao CNP, MD Pediatrics   Fritz Vazquez, MD Gabriela Marte MD Amelia Massimini APRN CNP   Ernestina Ruiz, APRN CNP MD Jose Beckett MD          Clinic hours: Monday - Thursday 7 am-6 pm; Fridays 7 am-5 pm.   Urgent care: Monday - Friday 10 am- 8 pm; Saturday and Sunday 9 am-5 pm.    Clinic: [phone]       [facility]: Monday - Thursday 8 am - 7 pm; Friday 8 am - 6 pm  [facility]: [phone]       If you are in crisis, you can call the Crisis Connection Hotline 24/7 at 614-066-2208  [facility] open 24/7 for anyone in crisis for assessment, evaluation and care: [phone]  If you are thinking of hurting yourself or someone else, you can also go to the emergency department or call [phone]

## 2023-03-01 NOTE — LETTER
March 6, 2023      Anna Marie Knight  [address]  [facility] [place] [address]        Hi Anna Marie,     Your Vitamin D level is deficient. Start taking 50,000 units of vitamin D 3 weekly for 8 week then start 2000 units of vitamin D3 daily with calcium intake of 1200 mg. Vitamin D is important in preventing bone loss, may reduce fatigue and may help to preventing certain cancers. I have sent a prescription to your pharmacy. Be sure to have this rechecked in 3 months.       Your urine culture did not show any growth so you UTI.     Your blood count, Ferritin, Thyroid function test, Syphilis, Gonorrhea and Chlamydia tests , HIV, Hepatitis B and Hepatitis C screens, and  liver function tests were all normal/negative.     Feel free to contact me with any questions or concerns.  Thank you for allowing me to participate in your care.         Sonia PICKENS, CNP       Resulted Orders   Wet prep - Clinic Collect   Result Value Ref Range    Trichomonas Absent Absent    Yeast Absent Absent    Clue Cells Present (A) Absent    WBCs/high power field 4+ (A) None   NEISSERIA GONORRHOEA PCR   Result Value Ref Range    Neisseria gonorrhoeae Negative Negative      Comment:      Negative for N. gonorrhoeae rRNA by transcription mediated amplification. A negative result by transcription mediated amplification does not preclude the presence of C. trachomatis infection because results are dependent on proper and adequate collection, absence of inhibitors and sufficient rRNA to be detected.   CHLAMYDIA TRACHOMATIS PCR   Result Value Ref Range    Chlamydia trachomatis Negative Negative      Comment:      A negative result by transcription mediated amplification does not preclude the presence of C. trachomatis infection because results are dependent on proper and adequate collection, absence of inhibitors and sufficient rRNA to be detected.   HIV Antigen Antibody Combo   Result Value Ref Range    HIV Antigen Antibody Combo Nonreactive  Nonreactive      Comment:      HIV-1 p24 Ag & HIV-1/HIV-2 Ab Not Detected   Hepatitis C Screen Reflex to HCV RNA Quant and Genotype   Result Value Ref Range    Hepatitis C Antibody Nonreactive Nonreactive    Narrative    Assay performance characteristics have not been established for newborns, infants, and children.   Vitamin D Deficiency   Result Value Ref Range    Vitamin D, Total (25-Hydroxy) 7 (L) 20 - 75 ug/L    Narrative    Season, race, dietary intake, and treatment affect the concentration of 25-hydroxy-Vitamin D. Values may decrease during winter months and increase during summer months. Values 20-29 ug/L may indicate Vitamin D insufficiency and values <20 ug/L may indicate Vitamin D deficiency.    Vitamin D determination is routinely performed by an immunoassay specific for 25 hydroxyvitamin D3.  If an individual is on vitamin D2(ergocalciferol) supplementation, please specify 25 OH vitamin D2 and D3 level determination by LCMSMS test VITD23.     CBC with platelets   Result Value Ref Range    WBC Count 4.4 4.0 - 11.0 10e3/uL    RBC Count 4.03 3.80 - 5.20 10e6/uL    Hemoglobin 12.0 11.7 - 15.7 g/dL    Hematocrit 36.4 35.0 - 47.0 %    MCV 90 78 - 100 fL    MCH 29.8 26.5 - 33.0 pg    MCHC 33.0 31.5 - 36.5 g/dL    RDW 12.4 10.0 - 15.0 %    Platelet Count 324 150 - 450 10e3/uL   Ferritin   Result Value Ref Range    Ferritin 58 12 - 150 ng/mL   TSH with free T4 reflex   Result Value Ref Range    TSH 2.90 0.40 - 4.00 mU/L   UA Macro with Reflex to Micro and Culture - lab collect   Result Value Ref Range    Color Urine Yellow Colorless, Straw, Light Yellow, Yellow    Appearance Urine Slightly Cloudy (A) Clear    Glucose Urine Negative Negative mg/dL    Bilirubin Urine Negative Negative    Ketones Urine Negative Negative mg/dL    Specific Gravity Urine 1.010 1.003 - 1.035    Blood Urine Negative Negative    pH Urine 7.0 5.0 - 7.0    Protein Albumin Urine Negative Negative mg/dL    Urobilinogen Urine 0.2 0.2, 1.0  E.U./dL    Nitrite Urine Negative Negative    Leukocyte Esterase Urine Trace (A) Negative   Comprehensive metabolic panel (BMP + Alb, Alk Phos, ALT, AST, Total. Bili, TP)   Result Value Ref Range    Sodium 138 133 - 144 mmol/L    Potassium 4.6 3.4 - 5.3 mmol/L    Chloride 107 94 - 109 mmol/L    Carbon Dioxide (CO2) 29 20 - 32 mmol/L    Anion Gap 2 (L) 3 - 14 mmol/L    Urea Nitrogen 9 7 - 30 mg/dL    Creatinine 0.62 0.52 - 1.04 mg/dL    Calcium 8.9 8.5 - 10.1 mg/dL    Glucose 92 70 - 99 mg/dL    Alkaline Phosphatase 62 40 - 150 U/L    AST 25 0 - 45 U/L    ALT 25 0 - 50 U/L    Protein Total 8.5 6.8 - 8.8 g/dL    Albumin 3.6 3.4 - 5.0 g/dL    Bilirubin Total 0.3 0.2 - 1.3 mg/dL    GFR Estimate >90 >60 mL/min/1.73m2      Comment:      eGFR calculated using 2021 CKD-EPI equation.   Hepatitis B surface antigen   Result Value Ref Range    Hepatitis B Surface Antigen Nonreactive Nonreactive   Treponema Abs w Reflex to RPR and Titer   Result Value Ref Range    Treponema Antibody Total Nonreactive Nonreactive   UA Microscopic with Reflex to Culture   Result Value Ref Range    Bacteria Urine Few (A) None Seen /HPF    RBC Urine 0-2 0-2 /HPF /HPF    WBC Urine 10-25 (A) 0-5 /HPF /HPF    Squamous Epithelials Urine Few (A) None Seen /LPF   Urine Culture   Result Value Ref Range    Culture No Growth    GGT   Result Value Ref Range    GGT 27 5 - 36 U/L

## 2023-03-01 NOTE — LETTER
My Depression Action Plan  Name: Anna Marie Knight   Date of Birth 1995  Date: 3/1/2023    My doctor: No Ref-Primary, Physician   My clinic: [address] [id number] [phone]          GREEN    ZONE   Good Control    What it looks like:     Things are going generally well. You have normal ups and downs. You may even feel depressed from time to time, but bad moods usually last less than a day.   What you need to do:  1. Continue to care for yourself (see self care plan)  2. Check your depression survival kit and update it as needed  3. Follow your physician s recommendations including any medication.  4. Do not stop taking medication unless you consult with your physician first.           YELLOW         ZONE Getting Worse    What it looks like:     Depression is starting to interfere with your life.     It may be hard to get out of bed; you may be starting to isolate yourself from others.    Symptoms of depression are starting to last most all day and this has happened for several days.     You may have suicidal thoughts but they are not constant.   What you need to do:     1. Call your care team. Your response to treatment will improve if you keep your care team informed of your progress. Yellow periods are signs an adjustment may need to be made.     2. Continue your self-care.  Just get dressed and ready for the day.  Don't give yourself time to talk yourself out of it.    3. Talk to someone in your support network.    4. Open up your Depression Self-Care Plan/Wellness Kit.           RED    ZONE Medical Alert - Get Help    What it looks like:     Depression is seriously interfering with your life.     You may experience these or other symptoms: You can t get out of bed most days, can t work or engage in other necessary activities, you have trouble taking care of basic hygiene, or basic responsibilities, thoughts of suicide or  death that will not go away, self-injurious behavior.     What you need to do:  1. Call your care team and request a same-day appointment. If they are not available (weekends or after hours) call your local crisis line, emergency room or 911.          Depression Self-Care Plan / Wellness Kit    Many people find that medication and therapy are helpful treatments for managing depression. In addition, making small changes to your everyday life can help to boost your mood and improve your wellbeing. Below are some tips for you to consider. Be sure to talk with your medical provider and/or behavioral health consultant if your symptoms are worsening or not improving.     Sleep   Sleep hygiene  means all of the habits that support good, restful sleep. It includes maintaining a consistent bedtime and wake time, using your bedroom only for sleeping or sex, and keeping the bedroom dark and free of distractions like a computer, smartphone, or television.     Develop a Healthy Routine  Maintain good hygiene. Get out of bed in the morning, make your bed, brush your teeth, take a shower, and get dressed. Don t spend too much time viewing media that makes you feel stressed. Find time to relax each day.    Exercise  Get some form of exercise every day. This will help reduce pain and release endorphins, the  feel good  chemicals in your brain. It can be as simple as just going for a walk or doing some gardening, anything that will get you moving.      Diet  Strive to eat healthy foods, including fruits and vegetables. Drink plenty of water. Avoid excessive sugar, caffeine, alcohol, and other mood-altering substances.     Stay Connected with Others  Stay in touch with friends and family members.    Manage Your Mood  Try deep breathing, massage therapy, biofeedback, or meditation. Take part in fun activities when you can. Try to find something to smile about each day.     Psychotherapy  Be open to working with a therapist if your  provider recommends it.     Medication  Be sure to take your medication as prescribed. Most anti-depressants need to be taken every day. It usually takes several weeks for medications to work. Not all medicines work for all people. It is important to follow-up with your provider to make sure you have a treatment plan that is working for you. Do not stop your medication abruptly without first discussing it with your provider.    Crisis Resources   These hotlines are for both adults and children. They and are open 24 hours a day, 7 days a week unless noted otherwise.      National Suicide Prevention Lifeline   988 or 1-977-036-LHJX (7463)      Crisis Text Line    www.crisistextline.org  Text HOME to 640529 from anywhere in the [place], anytime, about any type of crisis. A live, trained crisis counselor will receive the text and respond quickly.      [place] Lifeline for LGBTQ Youth  A national crisis intervention and suicide lifeline for LGBTQ youth under 25. Provides a safe place to talk without judgement. Call 1-114.193.8861; text START to 788439 or visit www.thetrevorproject.org to talk to a trained counselor.      For [facility] crisis numbers, visit the [facility] website at:  https://[place].gov/dhs/people-we-serve/adults/health-care/mental-health/resources/crisis-contacts.jsp

## 2023-03-02 ENCOUNTER — TELEPHONE (OUTPATIENT)
Dept: FAMILY MEDICINE | Facility: CLINIC | Age: 28
End: 2023-03-02

## 2023-03-02 LAB
C TRACH DNA SPEC QL NAA+PROBE: NEGATIVE
GGT SERPL-CCNC: 27 U/L (ref 5–36)
N GONORRHOEA DNA SPEC QL NAA+PROBE: NEGATIVE
T PALLIDUM AB SER QL: NONREACTIVE

## 2023-03-02 NOTE — TELEPHONE ENCOUNTER
Patient returned call to clinic.     RN relayed message from provider below for results.     RN provided education for nitroFURantoin macrocrystal-monohydrate (MACROBID) 100 MG capsule and metroNIDAZOLE (FLAGYL) 500 MG tablet.    Patient verbalized understanding and denied further questions at this time.     CARMEN CostaN, RN  [employer]

## 2023-03-02 NOTE — RESULT ENCOUNTER NOTE
Pls call patient with the message below:    CHINA Thrasher,    Your urine shows a UTI so I am writing for some Macrobid for you - take one tablet twice daily for 7 days.. Stay well hydrated, practice good genital hygiene and let me know if you continue occupational therapy have symptoms.    Your wet prep showed a bacterial vaginal infection for which I am writing for fFagyl. Take one tablet twice daily for 7 days.    Your blood count was normal.    Feel free to contact me with any questions or concerns.  Thank you for allowing me to participate in your care.        Sonia PICKENS, CNP

## 2023-03-02 NOTE — TELEPHONE ENCOUNTER
This writer attempted to contact patient on 03/02/23      Reason for call results  and left message.      If patient calls back:   Registered Nurse called.         ----- Message from CELIO Murphy CNP sent at 3/1/2023  6:39 PM CST -----  Pls call patient with the message below:    CHINA Thrasher,    Your urine shows a UTI so I am writing for some Macrobid for you - take one tablet twice daily for 7 days.. Stay well hydrated, practice good genital hygiene and let me know if you continue occupational therapy have symptoms.    Your wet prep showed a bacterial vaginal infection for which I am writing for fFagyl. Take one tablet twice daily for 7 days.    Your blood count was normal.    Feel free to contact me with any questions or concerns.  Thank you for allowing me to participate in your care.      Sonia PICKENS, JAMES Hernandez RN  [facility]

## 2023-03-03 LAB
BACTERIA UR CULT: NO GROWTH
DEPRECATED CALCIDIOL+CALCIFEROL SERPL-MC: 7 UG/L (ref 20–75)
HBV SURFACE AG SERPL QL IA: NONREACTIVE
HCV AB SERPL QL IA: NONREACTIVE
HIV 1+2 AB+HIV1 P24 AG SERPL QL IA: NONREACTIVE

## 2023-03-06 ENCOUNTER — TELEPHONE (OUTPATIENT)
Dept: FAMILY MEDICINE | Facility: CLINIC | Age: 28
End: 2023-03-06

## 2023-03-06 LAB
BKR LAB AP GYN ADEQUACY: NORMAL
BKR LAB AP GYN INTERPRETATION: NORMAL
BKR LAB AP HPV REFLEX: NORMAL
BKR LAB AP LMP: NORMAL
BKR LAB AP PREVIOUS ABNORMAL: NORMAL
PATH REPORT.COMMENTS IMP SPEC: NORMAL
PATH REPORT.COMMENTS IMP SPEC: NORMAL
PATH REPORT.RELEVANT HX SPEC: NORMAL

## 2023-03-06 RX ORDER — ERGOCALCIFEROL 1.25 MG/1
50000 CAPSULE, LIQUID FILLED ORAL WEEKLY
Qty: 8 CAPSULE | Refills: 0 | Status: SHIPPED | OUTPATIENT
Start: 2023-03-06

## 2023-03-06 NOTE — RESULT ENCOUNTER NOTE
Yeison Thrasher,    Your Vitamin D level is deficient. Start taking 50,000 units of vitamin D 3 weekly for 8 week then start 2000 units of vitamin D3 daily with calcium intake of 1200 mg. Vitamin D is important in preventing bone loss, may reduce fatigue and may help to preventing certain cancers. I have sent a prescription to your pharmacy. Be sure to have this rechecked in 3 months.      Your urine culture did not show any growth so you UTI.    Your blood count, Ferritin, Thyroid function test, Syphilis, Gonorrhea and Chlamydia tests , HIV, Hepatitis B and Hepatitis C screens, and  liver function tests were all normal/negative.     Feel free to contact me with any questions or concerns.  Thank you for allowing me to participate in your care.        Sonia PICKENS, CNP

## 2023-03-06 NOTE — TELEPHONE ENCOUNTER
Patient is calling to check on the status of this form is due by the 7th patient states to minor as urgent

## 2023-03-06 NOTE — TELEPHONE ENCOUNTER
Reason for Call:  Appointment Request    Patient requesting this type of appt: Chronic Diease Management/Medication/Follow-Up    Requested provider: Sonia Ruiz    Reason patient unable to be scheduled: Not within requested timeframe    When does patient want to be seen/preferred time: 1-2 weeks    Comments: Patient has an appt set up for 04/11/2023 for a med check but wants to be seen sooner in person if possible    Okay to leave a detailed message?: Yes at Cell number on file:    Telephone Information:   Mobile [phone]   Mobile [phone]       Call taken on 3/6/2023 at 1:12 PM by Isabel Ramsey

## 2023-03-09 ENCOUNTER — TELEPHONE (OUTPATIENT)
Dept: BEHAVIORAL HEALTH | Facility: CLINIC | Age: 28
End: 2023-03-09

## 2023-03-09 NOTE — TELEPHONE ENCOUNTER
Reached out to pt to offer [facility] appt per the request of CELIO Thacker .Left voicemail with behavioral intakes number for scheduling.

## 2023-03-10 NOTE — TELEPHONE ENCOUNTER
Also additional form from the [place] received via fax. Form placed in provider's bin to address.

## 2023-03-10 NOTE — TELEPHONE ENCOUNTER
Form received via fax because page 3 was not completed. Form placed in provider's bin to complete page three.

## 2023-03-13 ENCOUNTER — TELEPHONE (OUTPATIENT)
Dept: FAMILY MEDICINE | Facility: CLINIC | Age: 28
End: 2023-03-13

## 2023-03-13 ENCOUNTER — MYC MEDICAL ADVICE (OUTPATIENT)
Dept: FAMILY MEDICINE | Facility: CLINIC | Age: 28
End: 2023-03-13

## 2023-03-13 NOTE — TELEPHONE ENCOUNTER
Pt called requesting the forms. Please advise.     Ortiz MUELLER [facility]    Primary Care

## 2023-03-14 NOTE — TELEPHONE ENCOUNTER
Pt called asking about her forms again. Pt stated that she is running out of time to get these in and would like them filled ASAP.    Ortiz MUELLER [facility]    Primary Care

## 2023-03-21 ENCOUNTER — OFFICE VISIT (OUTPATIENT)
Dept: FAMILY MEDICINE | Facility: CLINIC | Age: 28
End: 2023-03-21
Payer: COMMERCIAL

## 2023-03-21 VITALS
TEMPERATURE: 98.3 F | RESPIRATION RATE: 16 BRPM | HEIGHT: 60 IN | OXYGEN SATURATION: 99 % | BODY MASS INDEX: 23.01 KG/M2 | WEIGHT: 117.2 LBS | HEART RATE: 99 BPM | DIASTOLIC BLOOD PRESSURE: 67 MMHG | SYSTOLIC BLOOD PRESSURE: 97 MMHG

## 2023-03-21 DIAGNOSIS — Z23 NEED FOR IMMUNIZATION AGAINST INFLUENZA: ICD-10-CM

## 2023-03-21 DIAGNOSIS — F33.41 RECURRENT MAJOR DEPRESSIVE DISORDER, IN PARTIAL REMISSION (H): Primary | ICD-10-CM

## 2023-03-21 DIAGNOSIS — E55.9 VITAMIN D DEFICIENCY: ICD-10-CM

## 2023-03-21 PROBLEM — F32.3 MAJOR DEPRESSIVE DISORDER, SINGLE EPISODE, SEVERE WITH PSYCHOTIC FEATURES (H): Status: ACTIVE | Noted: 2023-03-21

## 2023-03-21 PROBLEM — F33.3 SEVERE RECURRENT MAJOR DEPRESSIVE DISORDER WITH PSYCHOTIC FEATURES (H): Status: ACTIVE | Noted: 2023-03-21

## 2023-03-21 PROCEDURE — 90686 IIV4 VACC NO PRSV 0.5 ML IM: CPT | Performed by: NURSE PRACTITIONER

## 2023-03-21 PROCEDURE — 99215 OFFICE O/P EST HI 40 MIN: CPT | Mod: 25 | Performed by: NURSE PRACTITIONER

## 2023-03-21 PROCEDURE — 90471 IMMUNIZATION ADMIN: CPT | Performed by: NURSE PRACTITIONER

## 2023-03-21 PROCEDURE — 96127 BRIEF EMOTIONAL/BEHAV ASSMT: CPT | Performed by: NURSE PRACTITIONER

## 2023-03-21 RX ORDER — VITAMIN B COMPLEX
1000 TABLET ORAL DAILY
COMMUNITY

## 2023-03-21 RX ORDER — ESCITALOPRAM OXALATE 10 MG/1
1 TABLET ORAL
COMMUNITY
Start: 2022-10-06 | End: 2023-03-21

## 2023-03-21 ASSESSMENT — ANXIETY QUESTIONNAIRES
7. FEELING AFRAID AS IF SOMETHING AWFUL MIGHT HAPPEN: NEARLY EVERY DAY
2. NOT BEING ABLE TO STOP OR CONTROL WORRYING: NEARLY EVERY DAY
4. TROUBLE RELAXING: MORE THAN HALF THE DAYS
6. BECOMING EASILY ANNOYED OR IRRITABLE: NEARLY EVERY DAY
7. FEELING AFRAID AS IF SOMETHING AWFUL MIGHT HAPPEN: NEARLY EVERY DAY
GAD7 TOTAL SCORE: 18
3. WORRYING TOO MUCH ABOUT DIFFERENT THINGS: NEARLY EVERY DAY
IF YOU CHECKED OFF ANY PROBLEMS ON THIS QUESTIONNAIRE, HOW DIFFICULT HAVE THESE PROBLEMS MADE IT FOR YOU TO DO YOUR WORK, TAKE CARE OF THINGS AT HOME, OR GET ALONG WITH OTHER PEOPLE: EXTREMELY DIFFICULT
1. FEELING NERVOUS, ANXIOUS, OR ON EDGE: MORE THAN HALF THE DAYS
GAD7 TOTAL SCORE: 18
GAD7 TOTAL SCORE: 18
8. IF YOU CHECKED OFF ANY PROBLEMS, HOW DIFFICULT HAVE THESE MADE IT FOR YOU TO DO YOUR WORK, TAKE CARE OF THINGS AT HOME, OR GET ALONG WITH OTHER PEOPLE?: EXTREMELY DIFFICULT
5. BEING SO RESTLESS THAT IT IS HARD TO SIT STILL: MORE THAN HALF THE DAYS

## 2023-03-21 ASSESSMENT — PATIENT HEALTH QUESTIONNAIRE - PHQ9
SUM OF ALL RESPONSES TO PHQ QUESTIONS 1-9: 19
10. IF YOU CHECKED OFF ANY PROBLEMS, HOW DIFFICULT HAVE THESE PROBLEMS MADE IT FOR YOU TO DO YOUR WORK, TAKE CARE OF THINGS AT HOME, OR GET ALONG WITH OTHER PEOPLE: EXTREMELY DIFFICULT
SUM OF ALL RESPONSES TO PHQ QUESTIONS 1-9: 19

## 2023-03-21 ASSESSMENT — PAIN SCALES - GENERAL: PAINLEVEL: NO PAIN (0)

## 2023-03-21 NOTE — PATIENT INSTRUCTIONS
At [facility], we strive to deliver an exceptional experience to you, every time we see you. If you receive a survey, please complete it as we do value your feedback.  If you have MyChart, you can expect to receive results automatically within 24 hours of their completion.  Your provider will send a note interpreting your results as well.   If you do not have MyChart, you should receive your results in about a week by mail.    Your care team:                            Family Medicine Internal Medicine   MD Lavell Willis MD Shantel Branch-Fleming, MD Srinivasa Vaka, MD Katya Belousova, ANGIE JohnsonHillCELIO Cao CNP, MD Pediatrics   Fritz Vazquez, MD Gabriela Marte MD Amelia Massimini APRN CNP   Ernestina Ruiz, APRN CNP MD Jose Beckett MD          Clinic hours: Monday - Thursday 7 am-6 pm; Fridays 7 am-5 pm.   Urgent care: Monday - Friday 10 am- 8 pm; Saturday and Sunday 9 am-5 pm.    Clinic: [phone]       [facility]: Monday - Thursday 8 am - 7 pm; Friday 8 am - 6 pm  [facility]: [phone]         If you are in crisis, you can call the Crisis Connection Hotline 24/7 at [phone]  [facility] open 24/7 for anyone in crisis for assessment, evaluation and care: [phone]  If you are thinking of hurting yourself or someone else, you can also go to the emergency department or call [phone]

## 2023-03-21 NOTE — TELEPHONE ENCOUNTER
Received completed form printed visit notes from 3/1 and 3/21 faxed to the [place] at [phone] right fax confirmed @ 3:51pm     Called and informed patient form was completed       copy placed in TC file and abstracting

## 2023-03-21 NOTE — PROGRESS NOTES
Assessment & Plan     Recurrent major depressive disorder, in partial remission (H)  Continue on Lexapro, can take Hydroxyzine prn for anxiety. She'll speak with Evelin Marcos [facility] this week for interim counseling, has counseling appt scheduled for 4/25/23. We again discussed safety plan and verbally contracted for safety. Encouraged regular exercise, healthy diet, and encouraged her to be around safe other people.     Vitamin D deficiency   the vitamin D from the pharmacy and start it. We reviewed benefits, dosing, recheck vit d in 3 months.    Need for immunization against influenza    - INFLUENZA VACCINE IM > 6 MONTHS VALENT IIV4 (AFLURIA/FLUZONE)      Prescription drug management  44 minutes spent on the date of the encounter doing chart review, history and exam, documentation and further activities per the note       Depression Screening Follow Up    PHQ 3/21/2023   PHQ-9 Total Score 19   Q9: Thoughts of better off dead/self-harm past 2 weeks Several days   F/U: Thoughts of suicide or self-harm Yes   F/U: Self harm-plan No   F/U: Self-harm action No   F/U: Safety concerns No     Last PHQ-9 3/21/2023   1.  Little interest or pleasure in doing things 1   2.  Feeling down, depressed, or hopeless 1   3.  Trouble falling or staying asleep, or sleeping too much 3   4.  Feeling tired or having little energy 2   5.  Poor appetite or overeating 3   6.  Feeling bad about yourself 3   7.  Trouble concentrating 3   8.  Moving slowly or restless 2   Q9: Thoughts of better off dead/self-harm past 2 weeks 1   PHQ-9 Total Score 19   In the past two weeks have you had thoughts of suicide or self harm? Yes   Do you have concerns about your personal safety or the safety of others? No   In the past 2 weeks have you thought about a plan or had intention to harm yourself? No   In the past 2 weeks have you acted on these thoughts in any way? No         Follow Up      Follow Up Actions Taken  Crisis resource information  provided in the After Visit Summary  Scheduled appointment with [facility]    Discussed the following ways the patient can remain in a safe environment:  remove alcohol and be around others  Regular exercise  See Patient Instructions    No follow-ups on file.    CELIO Mitchell [facility] GEOVANI Thrasher is a 28 year old, presenting for the following health issues:  Recheck Medication      History of Present Illness       Mental Health Follow-up:  Patient presents to follow-up on Depression & Anxiety.Patient's depression since last visit has been:  Good  The patient is having other symptoms associated with depression.  Patient's anxiety since last visit has been:  Bad  The patient is having other symptoms associated with anxiety.  Any significant life events: relationship concerns, job concerns and financial concerns  Patient is feeling anxious or having panic attacks.  Patient has no concerns about alcohol or drug use.    She eats 2-3 servings of fruits and vegetables daily.She consumes 2 sweetened beverage(s) daily.She exercises with enough effort to increase her heart rate 9 or less minutes per day.  She exercises with enough effort to increase her heart rate 3 or less days per week. She is missing 3 dose(s) of medications per week.  She is not taking prescribed medications regularly due to cost of medication, side effects and remembering to take.    Today's PHQ-9         PHQ-9 Total Score: 19    PHQ-9 Q9 Thoughts of better off dead/self-harm past 2 weeks :   Several days  Thoughts of suicide or self harm: (P) Yes  Self-harm Plan:   (P) No  Self-harm Action:     (P) No  Safety concerns for self or others: (P) No    How difficult have these problems made it for you to do your work, take care of things at home, or get along with other people: Extremely difficult  Today's HARISH-7 Score: 18   Anna Marie presents for follow up of her severe depression.  She continues on Lexapro 20 mg  daily, continues to have severe fatigue, has not yet started the 50,000 international unit(s) vitamin D weekly.  Her mood is improved but her anxiety seems to have worsened.  She has not yet started counseling  But has an appt 4/25/23 and will touch base with Evelin Marcos [facility] this week or early next week. She needs STD paperwork completed before she can return to work. She denies SI/HI, wants to return to work FT without restrictions on 3/27/23.        Review of Systems   Constitutional, HEENT, cardiovascular, pulmonary, gi and gu systems are negative, except as otherwise noted.      Objective    BP 97/67 (BP Location: Right arm, Patient Position: Sitting, Cuff Size: Adult Regular)   Pulse 99   Temp 98.3  F (36.8  C) (Oral)   Resp 16   Ht 1.524 m (5')   Wt 53.2 kg (117 lb 3.2 oz)   LMP 03/07/2023   SpO2 99%   BMI 22.89 kg/m    Body mass index is 22.89 kg/m .  Physical Exam   GENERAL: healthy, alert and no distress  NECK: no adenopathy, no asymmetry, masses, or scars and thyroid normal to palpation  RESP: lungs clear to auscultation - no rales, rhonchi or wheezes  CV: regular rate and rhythm, normal S1 S2, no S3 or S4, no murmur, click or rub, no peripheral edema and peripheral pulses strong  ABDOMEN: soft, nontender, no hepatosplenomegaly, no masses and bowel sounds normal  MS: no gross musculoskeletal defects noted, no edema  PSYCH: mentation appears normal and affect flat  LYMPH: no cervical adenopathy

## 2023-03-21 NOTE — TELEPHONE ENCOUNTER
Received completed form printed visit notes from 3/1 and 3/21 faxed to the [place] at [phone] right fax confirmed @ 3:51pm      Called and informed patient form was completed         copy placed in TC file and abstracting

## 2023-03-29 NOTE — TELEPHONE ENCOUNTER
Received a more specific information request form regarding disability and patient signed GENEVIEVE from, [employer], via fax. Placed in Ernestina Ruiz's box.  [facility]  2nd Floor  Primary Care

## 2023-04-05 NOTE — TELEPHONE ENCOUNTER
Forms completed and faxed back to the [employer] [phone], copy placed in abstract and original placed in tc bin.

## 2023-04-12 ENCOUNTER — VIRTUAL VISIT (OUTPATIENT)
Dept: BEHAVIORAL HEALTH | Facility: CLINIC | Age: 28
End: 2023-04-12
Payer: COMMERCIAL

## 2023-04-12 DIAGNOSIS — F41.1 GENERALIZED ANXIETY DISORDER: ICD-10-CM

## 2023-04-12 DIAGNOSIS — F33.2 SEVERE EPISODE OF RECURRENT MAJOR DEPRESSIVE DISORDER, WITHOUT PSYCHOTIC FEATURES (H): Primary | ICD-10-CM

## 2023-04-12 PROCEDURE — 90837 PSYTX W PT 60 MINUTES: CPT | Mod: VID | Performed by: SOCIAL WORKER

## 2023-04-12 ASSESSMENT — COLUMBIA-SUICIDE SEVERITY RATING SCALE - C-SSRS
2. HAVE YOU ACTUALLY HAD ANY THOUGHTS OF KILLING YOURSELF?: YES
5. HAVE YOU STARTED TO WORK OUT OR WORKED OUT THE DETAILS OF HOW TO KILL YOURSELF? DO YOU INTEND TO CARRY OUT THIS PLAN?: NO
REASONS FOR IDEATION PAST MONTH: MOSTLY TO END OR STOP THE PAIN (YOU COULDN'T GO ON LIVING WITH THE PAIN OR HOW YOU WERE FEELING)
4. HAVE YOU HAD THESE THOUGHTS AND HAD SOME INTENTION OF ACTING ON THEM?: YES
2. HAVE YOU ACTUALLY HAD ANY THOUGHTS OF KILLING YOURSELF?: YES
FIRST ATTEMPT DATE: 66281
3. HAVE YOU BEEN THINKING ABOUT HOW YOU MIGHT KILL YOURSELF?: NO
4. HAVE YOU HAD THESE THOUGHTS AND HAD SOME INTENTION OF ACTING ON THEM?: NO
LETHALITY/MEDICAL DAMAGE CODE FIRST POTENTIAL ATTEMPT: BEHAVIOR LIKELY TO RESULT IN DEATH DESPITE AVAILABLE MEDICAL CARE
LETHALITY/MEDICAL DAMAGE CODE MOST RECENT POTENTIAL ATTEMPT: BEHAVIOR LIKELY TO RESULT IN DEATH DESPITE AVAILABLE MEDICAL CARE
1. HAVE YOU WISHED YOU WERE DEAD OR WISHED YOU COULD GO TO SLEEP AND NOT WAKE UP?: YES
5. HAVE YOU STARTED TO WORK OUT OR WORKED OUT THE DETAILS OF HOW TO KILL YOURSELF? DO YOU INTEND TO CARRY OUT THIS PLAN?: YES
TOTAL  NUMBER OF INTERRUPTED ATTEMPTS LIFETIME: NO
LETHALITY/MEDICAL DAMAGE CODE MOST RECENT ACTUAL ATTEMPT: NO PHYSICAL DAMAGE OR VERY MINOR PHYSICAL DAMAGE
1. IN THE PAST MONTH, HAVE YOU WISHED YOU WERE DEAD OR WISHED YOU COULD GO TO SLEEP AND NOT WAKE UP?: YES
MOST RECENT DATE: 66281
LETHALITY/MEDICAL DAMAGE CODE MOST LETHAL POTENTIAL ATTEMPT: BEHAVIOR LIKELY TO RESULT IN DEATH DESPITE AVAILABLE MEDICAL CARE
6. HAVE YOU EVER DONE ANYTHING, STARTED TO DO ANYTHING, OR PREPARED TO DO ANYTHING TO END YOUR LIFE?: NO
LETHALITY/MEDICAL DAMAGE CODE FIRST ACTUAL ATTEMPT: NO PHYSICAL DAMAGE OR VERY MINOR PHYSICAL DAMAGE
LETHALITY/MEDICAL DAMAGE CODE MOST LETHAL ACTUAL ATTEMPT: NO PHYSICAL DAMAGE OR VERY MINOR PHYSICAL DAMAGE
REASONS FOR IDEATION LIFETIME: COMPLETELY TO END OR STOP THE PAIN (YOU COULDN'T GO ON LIVING WITH THE PAIN OR HOW YOU WERE FEELING)
TOTAL  NUMBER OF ABORTED OR SELF INTERRUPTED ATTEMPTS LIFETIME: NO
6. HAVE YOU EVER DONE ANYTHING, STARTED TO DO ANYTHING, OR PREPARED TO DO ANYTHING TO END YOUR LIFE?: YES
ATTEMPT PAST THREE MONTHS: NO
MOST LETHAL DATE: 66281
ATTEMPT LIFETIME: YES
TOTAL  NUMBER OF ACTUAL ATTEMPTS LIFETIME: 1
TOTAL  NUMBER OF PREPARATORY ACTS LIFETIME: 2

## 2023-04-12 ASSESSMENT — ANXIETY QUESTIONNAIRES
4. TROUBLE RELAXING: NEARLY EVERY DAY
8. IF YOU CHECKED OFF ANY PROBLEMS, HOW DIFFICULT HAVE THESE MADE IT FOR YOU TO DO YOUR WORK, TAKE CARE OF THINGS AT HOME, OR GET ALONG WITH OTHER PEOPLE?: EXTREMELY DIFFICULT
GAD7 TOTAL SCORE: 18
2. NOT BEING ABLE TO STOP OR CONTROL WORRYING: MORE THAN HALF THE DAYS
7. FEELING AFRAID AS IF SOMETHING AWFUL MIGHT HAPPEN: NEARLY EVERY DAY
1. FEELING NERVOUS, ANXIOUS, OR ON EDGE: NEARLY EVERY DAY
3. WORRYING TOO MUCH ABOUT DIFFERENT THINGS: MORE THAN HALF THE DAYS
7. FEELING AFRAID AS IF SOMETHING AWFUL MIGHT HAPPEN: NEARLY EVERY DAY
IF YOU CHECKED OFF ANY PROBLEMS ON THIS QUESTIONNAIRE, HOW DIFFICULT HAVE THESE PROBLEMS MADE IT FOR YOU TO DO YOUR WORK, TAKE CARE OF THINGS AT HOME, OR GET ALONG WITH OTHER PEOPLE: EXTREMELY DIFFICULT
5. BEING SO RESTLESS THAT IT IS HARD TO SIT STILL: NEARLY EVERY DAY
6. BECOMING EASILY ANNOYED OR IRRITABLE: MORE THAN HALF THE DAYS

## 2023-04-12 ASSESSMENT — PATIENT HEALTH QUESTIONNAIRE - PHQ9
10. IF YOU CHECKED OFF ANY PROBLEMS, HOW DIFFICULT HAVE THESE PROBLEMS MADE IT FOR YOU TO DO YOUR WORK, TAKE CARE OF THINGS AT HOME, OR GET ALONG WITH OTHER PEOPLE: VERY DIFFICULT
SUM OF ALL RESPONSES TO PHQ QUESTIONS 1-9: 24
SUM OF ALL RESPONSES TO PHQ QUESTIONS 1-9: 24
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
## 2023-04-25 ENCOUNTER — VIRTUAL VISIT (OUTPATIENT)
Dept: PSYCHOLOGY | Facility: CLINIC | Age: 28
End: 2023-04-25
Attending: NURSE PRACTITIONER
Payer: COMMERCIAL

## 2023-04-25 DIAGNOSIS — F41.1 GAD (GENERALIZED ANXIETY DISORDER): Primary | ICD-10-CM

## 2023-04-25 DIAGNOSIS — F33.2 SEVERE EPISODE OF RECURRENT MAJOR DEPRESSIVE DISORDER, WITHOUT PSYCHOTIC FEATURES (H): ICD-10-CM

## 2023-04-25 PROCEDURE — 90791 PSYCH DIAGNOSTIC EVALUATION: CPT | Mod: VID

## 2023-04-25 ASSESSMENT — PATIENT HEALTH QUESTIONNAIRE - PHQ9
10. IF YOU CHECKED OFF ANY PROBLEMS, HOW DIFFICULT HAVE THESE PROBLEMS MADE IT FOR YOU TO DO YOUR WORK, TAKE CARE OF THINGS AT HOME, OR GET ALONG WITH OTHER PEOPLE: EXTREMELY DIFFICULT
SUM OF ALL RESPONSES TO PHQ QUESTIONS 1-9: 27
SUM OF ALL RESPONSES TO PHQ QUESTIONS 1-9: 27
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
## 2023-05-22 ENCOUNTER — VIRTUAL VISIT (OUTPATIENT)
Dept: PSYCHOLOGY | Facility: CLINIC | Age: 28
End: 2023-05-22
Payer: COMMERCIAL

## 2023-05-22 DIAGNOSIS — F33.2 MDD (MAJOR DEPRESSIVE DISORDER), RECURRENT SEVERE, WITHOUT PSYCHOSIS (H): Primary | ICD-10-CM

## 2023-05-22 DIAGNOSIS — F41.1 GAD (GENERALIZED ANXIETY DISORDER): ICD-10-CM

## 2023-05-22 PROCEDURE — 90834 PSYTX W PT 45 MINUTES: CPT | Mod: VID

## 2023-05-22 NOTE — PROGRESS NOTES
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 drugs and alcohol on health and well-being.     8. Records:   These were reviewed at time of assessment.   Information in this assessment was obtained from the medical record and  provided by patient who is a good historian.    Patient will have open access to their mental health medical record.    9.   Interactive Complexity: No      Provider Name/ Credentials:  Dawn Power MS, [facility]  April 25, 2023          Answers for HPI/ROS submitted by the patient on 4/25/2023  If you checked off any problems, how difficult have these problems made it for you to do your work, take care of things at home, or get along with other people?: Extremely difficult  PHQ9 TOTAL SCORE: 27

## 2023-08-07 ENCOUNTER — OFFICE VISIT (OUTPATIENT)
Dept: URGENT CARE | Facility: URGENT CARE | Age: 28
End: 2023-08-07

## 2023-08-07 VITALS
HEART RATE: 98 BPM | BODY MASS INDEX: 22.6 KG/M2 | TEMPERATURE: 99.2 F | DIASTOLIC BLOOD PRESSURE: 71 MMHG | OXYGEN SATURATION: 98 % | WEIGHT: 115.7 LBS | SYSTOLIC BLOOD PRESSURE: 101 MMHG

## 2023-08-07 DIAGNOSIS — R30.0 DYSURIA: Primary | ICD-10-CM

## 2023-08-07 LAB
ALBUMIN UR-MCNC: NEGATIVE MG/DL
APPEARANCE UR: CLEAR
BILIRUB UR QL STRIP: NEGATIVE
COLOR UR AUTO: YELLOW
GLUCOSE UR STRIP-MCNC: NEGATIVE MG/DL
HGB UR QL STRIP: NEGATIVE
KETONES UR STRIP-MCNC: NEGATIVE MG/DL
LEUKOCYTE ESTERASE UR QL STRIP: NEGATIVE
NITRATE UR QL: NEGATIVE
PH UR STRIP: 7 [PH] (ref 5–7)
SP GR UR STRIP: 1.02 (ref 1–1.03)
UROBILINOGEN UR STRIP-ACNC: 0.2 E.U./DL

## 2023-08-07 PROCEDURE — 99213 OFFICE O/P EST LOW 20 MIN: CPT

## 2023-08-07 PROCEDURE — 81003 URINALYSIS AUTO W/O SCOPE: CPT

## 2023-08-07 NOTE — PROGRESS NOTES
ASSESSMENT:   (R30.0) Dysuria  (primary encounter diagnosis)  Plan: UA Macroscopic with reflex to Microscopic and         Culture - Lab Collect    PLAN:  Informed the patient that the urine test does not show urinary tract infection.  We discussed the need to continue to drink plenty of fluids.  We also discussed trying over-the-counter Azo and/or cranberry supplements for her urinary symptoms.  Discussed the need to return to clinic for further evaluation and treatment with any new or worsening symptoms.  Patient acknowledged her understanding of the above plan.    The use of Dragon/[employer] dictation services may have been used to construct the content in this note; any grammatical or spelling errors are non-intentional. Please contact the author of this note directly if you are in need of any clarification.      Chucky Chaves, CELIO CNP     SUBJECTIVE:   Anna Marie Knight is a 28 year old female who  presents today for a possible UTI. Symptoms of dysuria, urgency, frequency, and burning have been going on for 2day(s).  Hematuria no.  gradual onset and moderate to severe.  There is no history of fever, nausea or vomiting.  This patient does have a history of urinary tract infections. Patient denies vaginal symptoms or pregnancy.  She does report a concern for sexually transmitted diseases based on an unprotected sexual intercourse encounter this past Friday but states that she will return to clinic later this week for full testing.    First day of LMP 7/23/23.      ROS:   Negative except noted above.    OBJECTIVE:  GENERAL APPEARANCE: healthy, alert and no distress  SKIN: no suspicious lesions or rashes    UA: negative for all components

## 2023-08-08 NOTE — PATIENT INSTRUCTIONS
Urine test does not show urinary tract infection.  Continue to drink plenty of fluids.  You can also try over the counter Azo and/or cranberry supplements for your urinary symptoms.  Return to clinic for further evaluation and treatment with any new or worsening symptoms.

## 2023-12-09 ENCOUNTER — MYC REFILL (OUTPATIENT)
Dept: FAMILY MEDICINE | Facility: CLINIC | Age: 28
End: 2023-12-09

## 2023-12-09 DIAGNOSIS — F33.41 RECURRENT MAJOR DEPRESSIVE DISORDER, IN PARTIAL REMISSION (H): ICD-10-CM

## 2023-12-11 RX ORDER — ESCITALOPRAM OXALATE 20 MG/1
20 TABLET ORAL
Qty: 30 TABLET | Refills: 0 | Status: SHIPPED | OUTPATIENT
Start: 2023-12-11 | End: 2024-03-25

## 2023-12-19 PROBLEM — M25.512 CHRONIC LEFT SHOULDER PAIN: Status: RESOLVED | Noted: 2018-05-18 | Resolved: 2023-12-19

## 2023-12-19 PROBLEM — G89.29 CHRONIC LEFT SHOULDER PAIN: Status: RESOLVED | Noted: 2018-05-18 | Resolved: 2023-12-19

## 2024-01-02 ENCOUNTER — TELEPHONE (OUTPATIENT)
Dept: FAMILY MEDICINE | Facility: CLINIC | Age: 29
End: 2024-01-02

## 2024-01-02 NOTE — TELEPHONE ENCOUNTER
Patient Quality Outreach    Patient is due for the following:   Depression  -  PHQ-9 needed    Next Steps:   Patient was assigned appropriate questionnaire to complete    Type of outreach:    Sent [employer] message.    Next Steps:  Reach out within 90 days via Letter.    Max number of attempts reached: No. Will try again in 90 days if patient still on fail list.    Questions for provider review:    None           Camden Busby MA

## 2024-03-25 ENCOUNTER — MYC REFILL (OUTPATIENT)
Dept: FAMILY MEDICINE | Facility: CLINIC | Age: 29
End: 2024-03-25

## 2024-03-25 DIAGNOSIS — F33.41 RECURRENT MAJOR DEPRESSIVE DISORDER, IN PARTIAL REMISSION (H): ICD-10-CM

## 2024-03-26 RX ORDER — ESCITALOPRAM OXALATE 20 MG/1
20 TABLET ORAL
Qty: 30 TABLET | Refills: 0 | Status: SHIPPED | OUTPATIENT
Start: 2024-03-26 | End: 2024-05-23

## 2024-05-04 ENCOUNTER — HEALTH MAINTENANCE LETTER (OUTPATIENT)
Age: 29
End: 2024-05-04

## 2024-05-23 ENCOUNTER — MYC REFILL (OUTPATIENT)
Dept: FAMILY MEDICINE | Facility: CLINIC | Age: 29
End: 2024-05-23

## 2024-05-23 DIAGNOSIS — F33.41 RECURRENT MAJOR DEPRESSIVE DISORDER, IN PARTIAL REMISSION (H): ICD-10-CM

## 2024-05-29 RX ORDER — ESCITALOPRAM OXALATE 20 MG/1
20 TABLET ORAL
Qty: 30 TABLET | Refills: 0 | Status: SHIPPED | OUTPATIENT
Start: 2024-05-29 | End: 2024-08-26

## 2024-08-26 DIAGNOSIS — F33.41 RECURRENT MAJOR DEPRESSIVE DISORDER, IN PARTIAL REMISSION (H): ICD-10-CM

## 2024-08-26 NOTE — TELEPHONE ENCOUNTER
Medication Question or Refill        What medication are you calling about (include dose and sig)?: medication pended    Preferred Pharmacy:    [facility] [address]  [address]  [facility] [address]  Phone: [phone] Fax: [phone]      Controlled Substance Agreement on file:   CSA -- Patient Level:    CSA: None found at the patient level.       Who prescribed the medication?: Sonia Ruiz    Do you need a refill? Yes    When did you use the medication last?     Patient offered an appointment? Yes: Scheduled for October, soonest opening. Can she get a refill until that appointment or can she be seen sooner?    Do you have any questions or concerns?  No      Could we send this information to you in [place] or would you prefer to receive a phone call?:   Patient would prefer a phone call   Okay to leave a detailed message?: Yes at Cell number on file:    Telephone Information:   Mobile [phone]   Mobile Not on file.

## 2024-08-27 RX ORDER — ESCITALOPRAM OXALATE 20 MG/1
20 TABLET ORAL
Qty: 30 TABLET | Refills: 0 | Status: SHIPPED | OUTPATIENT
Start: 2024-08-27

## 2024-10-14 ENCOUNTER — TELEPHONE (OUTPATIENT)
Dept: FAMILY MEDICINE | Facility: CLINIC | Age: 29
End: 2024-10-14

## 2024-10-14 NOTE — TELEPHONE ENCOUNTER
Reason for Call:  Appointment Request    Patient requesting this type of appt: Chronic Diease Management/Medication/Follow-Up    Requested provider: Sonia Ruiz    Reason patient unable to be scheduled: Not within requested timeframe    When does patient want to be seen/preferred time: 1-2 days    Comments: Pt appt was cancel today; need to be seen asap     Could we send this information to you in [facility] or would you prefer to receive a phone call?:   Patient would prefer a phone call   Okay to leave a detailed message?: Yes at Cell number on file:    Telephone Information:   Mobile [phone]   Mobile Not on file.       Call taken on 10/14/2024 at 12:53 PM by Yoselin Solano

## 2024-10-15 ENCOUNTER — OFFICE VISIT (OUTPATIENT)
Dept: FAMILY MEDICINE | Facility: CLINIC | Age: 29
End: 2024-10-15

## 2024-10-15 VITALS
WEIGHT: 123.2 LBS | SYSTOLIC BLOOD PRESSURE: 107 MMHG | TEMPERATURE: 97.9 F | RESPIRATION RATE: 20 BRPM | OXYGEN SATURATION: 98 % | DIASTOLIC BLOOD PRESSURE: 74 MMHG | HEIGHT: 60 IN | BODY MASS INDEX: 24.19 KG/M2 | HEART RATE: 99 BPM

## 2024-10-15 DIAGNOSIS — F43.21 GRIEF REACTION: ICD-10-CM

## 2024-10-15 DIAGNOSIS — F33.41 RECURRENT MAJOR DEPRESSIVE DISORDER, IN PARTIAL REMISSION (H): Primary | ICD-10-CM

## 2024-10-15 DIAGNOSIS — E55.9 VITAMIN D DEFICIENCY: ICD-10-CM

## 2024-10-15 DIAGNOSIS — F43.10 PTSD (POST-TRAUMATIC STRESS DISORDER): ICD-10-CM

## 2024-10-15 DIAGNOSIS — Z02.89 ENCOUNTER FOR COMPLETION OF FORM WITH PATIENT: ICD-10-CM

## 2024-10-15 PROCEDURE — 96127 BRIEF EMOTIONAL/BEHAV ASSMT: CPT | Performed by: FAMILY MEDICINE

## 2024-10-15 PROCEDURE — 99214 OFFICE O/P EST MOD 30 MIN: CPT | Performed by: FAMILY MEDICINE

## 2024-10-15 PROCEDURE — 82306 VITAMIN D 25 HYDROXY: CPT | Performed by: FAMILY MEDICINE

## 2024-10-15 PROCEDURE — 36415 COLL VENOUS BLD VENIPUNCTURE: CPT | Performed by: FAMILY MEDICINE

## 2024-10-15 RX ORDER — ESCITALOPRAM OXALATE 20 MG/1
20 TABLET ORAL
Qty: 30 TABLET | Refills: 0 | Status: SHIPPED | OUTPATIENT
Start: 2024-10-15

## 2024-10-15 ASSESSMENT — PAIN SCALES - GENERAL: PAINLEVEL: NO PAIN (0)

## 2024-10-15 ASSESSMENT — PATIENT HEALTH QUESTIONNAIRE - PHQ9
SUM OF ALL RESPONSES TO PHQ QUESTIONS 1-9: 25
10. IF YOU CHECKED OFF ANY PROBLEMS, HOW DIFFICULT HAVE THESE PROBLEMS MADE IT FOR YOU TO DO YOUR WORK, TAKE CARE OF THINGS AT HOME, OR GET ALONG WITH OTHER PEOPLE: EXTREMELY DIFFICULT
SUM OF ALL RESPONSES TO PHQ QUESTIONS 1-9: 25

## 2024-10-15 NOTE — PROGRESS NOTES
Assessment & Plan     Recurrent major depressive disorder, in partial remission (H)  -Anna Marie presented as new patient to me.  -She has baseline major depressive disorder,PTSD currently going through grief .   -Dad passed away 2 months back and mom passed away last year.  -She has low mood but denied thoughts of hurting herself.  Does not have a plan.    PLAN:  -Prescribed Lexapro to continue.  -Therapy referral sent.  -Recommended to go to ER if feeling really low or thoughts of hurting herself.    - escitalopram (LEXAPRO) 20 MG tablet; Take 1 tablet (20 mg) by mouth daily at 2 pm.  - Adult Mental Health  Referral; Future    Grief reaction  -Same as above.  - Adult Mental Health  Referral; Future    PTSD (post-traumatic stress disorder)    - Adult Mental Health  Referral; Future    Vitamin D deficiency    - Vitamin D Deficiency; Future  - Vitamin D Deficiency    Encounter for completion of form with patient  -Letter provided to exempt her from jury duty.    Depression Screening Follow Up        10/15/2024     1:38 PM   PHQ   PHQ-9 Total Score 25   Q9: Thoughts of better off dead/self-harm past 2 weeks Nearly every day   F/U: Thoughts of suicide or self-harm No   F/U: Safety concerns No         10/15/2024     1:38 PM   Last PHQ-9   1.  Little interest or pleasure in doing things 3   2.  Feeling down, depressed, or hopeless 3   3.  Trouble falling or staying asleep, or sleeping too much 2   4.  Feeling tired or having little energy 3   5.  Poor appetite or overeating 3   6.  Feeling bad about yourself 3   7.  Trouble concentrating 3   8.  Moving slowly or restless 2   Q9: Thoughts of better off dead/self-harm past 2 weeks 3   PHQ-9 Total Score 25   In the past two weeks have you had thoughts of suicide or self harm? No   Do you have concerns about your personal safety or the safety of others? No                No data to display                      Follow Up Actions Taken  Crisis resource  information provided in the After Visit Summary  Mental Health Referral placed    Discussed the following ways the patient can remain in a safe environment:  be around others        Subjective   Anna Marie is a 29 year old, presenting for the following health issues:  MH Follow Up (Anxiety and Depression)      10/15/2024     1:37 PM   Additional Questions   Roomed by Abelino     History of Present Illness       Mental Health Follow-up:  Patient presents to follow-up on Depression & Anxiety.Patient's depression since last visit has been:  Worse  The patient is having other symptoms associated with depression.  Patient's anxiety since last visit has been:  Worse  The patient is having other symptoms associated with anxiety.  Any significant life events: job concerns, grief or loss and health concerns  Patient is feeling anxious or having panic attacks.  Patient has no concerns about alcohol or drug use.    She eats 0-1 servings of fruits and vegetables daily.She consumes 3 sweetened beverage(s) daily.She exercises with enough effort to increase her heart rate 10 to 19 minutes per day.  She exercises with enough effort to increase her heart rate 3 or less days per week. She is missing 1 dose(s) of medications per week.  She is not taking prescribed medications regularly due to cost of medication, side effects and remembering to take.               Review of Systems  Constitutional, HEENT, cardiovascular, pulmonary, gi and gu systems are negative, except as otherwise noted.      Objective    There were no vitals taken for this visit.  There is no height or weight on file to calculate BMI.  Physical Exam   GENERAL: alert and no distress  NECK: no adenopathy, no asymmetry, masses, or scars  RESP: lungs clear to auscultation - no rales, rhonchi or wheezes  CV: regular rate and rhythm, normal S1 S2, no S3 or S4, no murmur, click or rub, no peripheral edema  ABDOMEN: soft, nontender, no hepatosplenomegaly, no masses and bowel  sounds normal  MS: no gross musculoskeletal defects noted, no edema            Signed Electronically by: Jose Sandoval MD    Answers submitted by the patient for this visit:  Patient Health Questionnaire (Submitted on 10/15/2024)  If you checked off any problems, how difficult have these problems made it for you to do your work, take care of things at home, or get along with other people?: Extremely difficult  PHQ9 TOTAL SCORE: 25

## 2024-10-15 NOTE — LETTER
To whom it may concern,  I am the physician taking care of Anna Marie Knight. She has a medical condition which will make it difficult for her to attend Jury duty both physically and mentally .Kindly excuse her from jury duty for the next 6 months till April 2025.    Sincerely,  Jose Saleem MD

## 2024-10-16 LAB — VIT D+METAB SERPL-MCNC: 50 NG/ML (ref 20–50)

## 2024-10-16 NOTE — TELEPHONE ENCOUNTER
Signed Letter from 10/15/24 visit faxed to [facility] [phone] on 10/15/24 at 7:08am.

## 2024-12-31 ENCOUNTER — MYC REFILL (OUTPATIENT)
Dept: FAMILY MEDICINE | Facility: CLINIC | Age: 29
End: 2024-12-31

## 2024-12-31 DIAGNOSIS — F33.41 RECURRENT MAJOR DEPRESSIVE DISORDER, IN PARTIAL REMISSION (H): ICD-10-CM

## 2025-01-02 RX ORDER — ESCITALOPRAM OXALATE 20 MG/1
20 TABLET ORAL
Qty: 30 TABLET | Refills: 0 | Status: SHIPPED | OUTPATIENT
Start: 2025-01-02

## 2025-02-21 ENCOUNTER — MYC REFILL (OUTPATIENT)
Dept: FAMILY MEDICINE | Facility: CLINIC | Age: 30
End: 2025-02-21

## 2025-02-21 DIAGNOSIS — F33.41 RECURRENT MAJOR DEPRESSIVE DISORDER, IN PARTIAL REMISSION: ICD-10-CM

## 2025-02-24 RX ORDER — ESCITALOPRAM OXALATE 20 MG/1
20 TABLET ORAL
Qty: 30 TABLET | Refills: 0 | Status: SHIPPED | OUTPATIENT
Start: 2025-02-24

## 2025-02-27 ENCOUNTER — OFFICE VISIT (OUTPATIENT)
Dept: URGENT CARE | Facility: URGENT CARE | Age: 30
End: 2025-02-27

## 2025-02-27 VITALS
TEMPERATURE: 98.3 F | SYSTOLIC BLOOD PRESSURE: 95 MMHG | HEART RATE: 99 BPM | OXYGEN SATURATION: 96 % | BODY MASS INDEX: 23.44 KG/M2 | WEIGHT: 120 LBS | DIASTOLIC BLOOD PRESSURE: 67 MMHG | RESPIRATION RATE: 16 BRPM

## 2025-02-27 DIAGNOSIS — R07.0 THROAT PAIN: ICD-10-CM

## 2025-02-27 DIAGNOSIS — J06.9 VIRAL URI WITH COUGH: Primary | ICD-10-CM

## 2025-02-27 LAB
DEPRECATED S PYO AG THROAT QL EIA: NEGATIVE
S PYO DNA THROAT QL NAA+PROBE: NOT DETECTED

## 2025-02-27 RX ORDER — BENZONATATE 200 MG/1
200 CAPSULE ORAL 3 TIMES DAILY PRN
Qty: 21 CAPSULE | Refills: 0 | Status: SHIPPED | OUTPATIENT
Start: 2025-02-27

## 2025-02-27 ASSESSMENT — PAIN SCALES - GENERAL: PAINLEVEL_OUTOF10: MODERATE PAIN (4)

## 2025-02-28 NOTE — PROGRESS NOTES
ASSESSMENT:  (J06.9) Viral URI with cough  (primary encounter diagnosis)  Plan: benzonatate (TESSALON) 200 MG capsule    (R07.0) Throat pain  Plan: Streptococcus A Rapid Screen w/Reflex to PCR -         Clinic Collect, Group A Streptococcus PCR         Throat Swab    PLAN:  Informed the patient that the strep test is negative and that the symptoms are likely related to a viral illness pending the strep PCR result.  We discussed the need for her to take benzonatate as prescribed for the cough, get plenty of rest, drink fluids and use Tylenol as needed for pain with the maximum dose being 4000 mg in a 24-hour period of time.  We also discussed trying warm salt water gargles, hot/warm water or tea with honey and/or lemon and or Cepacol lozenges or spray for the sore throat.  Informed the patient to return to clinic with any new or worsening symptoms.  Patient acknowledged her understanding of the above plan.    The use of Dragon/[employer] dictation services may have been used to construct the content in this note; any grammatical or spelling errors are non-intentional. Please contact the author of this note directly if you are in need of any clarification.      Chucky Chaves, CELIO CNP      SUBJECTIVE:   Anna Marie Knight is a 30 year old female presenting with a chief complaint of cough - non-productive and sore throat.  Onset of symptoms was 2 day(s) ago.  Course of illness is same.    Patient denies: ear pain, vomiting, and diarrhea  Treatment measures tried include None tried.  Predisposing factors include ill contact: Family member .    ROS:  Negative except noted above.    OBJECTIVE:  BP 95/67 (BP Location: Left arm, Patient Position: Sitting, Cuff Size: Adult Regular)   Pulse 99   Temp 98.3  F (36.8  C) (Oral)   Resp 16   Wt 54.4 kg (120 lb)   SpO2 96%   Breastfeeding No   BMI 23.44 kg/m    GENERAL APPEARANCE: healthy, alert and no distress  EYES: EOMI,  PERRL, conjunctiva clear  HENT: ear canals and  TM's normal.  Nose and mouth without ulcers, erythema or lesions  NECK: supple, nontender, no lymphadenopathy  RESP: lungs clear to auscultation - no rales, rhonchi or wheezes  CV: regular rates and rhythm, normal S1 S2, no murmur noted  SKIN: no suspicious lesions or rashes    Rapid Strep test: Negative

## 2025-02-28 NOTE — PATIENT INSTRUCTIONS
Strep test is negative for strep throat.  Get plenty of rest and drink fluids.  Can use Tylenol as needed for pain.  Maximum dose of Tylenol is 4000mg in a 24 hour period of time.  Take the benzonatate as prescribed for the cough.  You can also try warm salt water gargles, hot/warm water or tea with honey and/or lemon and/or Cepacol lozenges or spray for your sore throat.

## 2025-03-06 ENCOUNTER — OFFICE VISIT (OUTPATIENT)
Dept: URGENT CARE | Facility: URGENT CARE | Age: 30
End: 2025-03-06

## 2025-03-06 VITALS
HEART RATE: 92 BPM | OXYGEN SATURATION: 99 % | RESPIRATION RATE: 18 BRPM | WEIGHT: 124 LBS | SYSTOLIC BLOOD PRESSURE: 98 MMHG | DIASTOLIC BLOOD PRESSURE: 68 MMHG | BODY MASS INDEX: 24.22 KG/M2 | TEMPERATURE: 97.4 F

## 2025-03-06 DIAGNOSIS — J06.9 VIRAL URI WITH COUGH: Primary | ICD-10-CM

## 2025-03-06 DIAGNOSIS — D64.9 ANEMIA, UNSPECIFIED TYPE: ICD-10-CM

## 2025-03-06 DIAGNOSIS — R07.0 THROAT PAIN: ICD-10-CM

## 2025-03-06 DIAGNOSIS — H92.02 LEFT EAR PAIN: ICD-10-CM

## 2025-03-06 LAB
BASOPHILS # BLD AUTO: 0 10E3/UL (ref 0–0.2)
BASOPHILS NFR BLD AUTO: 0 %
DEPRECATED S PYO AG THROAT QL EIA: NEGATIVE
EOSINOPHIL # BLD AUTO: 0.1 10E3/UL (ref 0–0.7)
EOSINOPHIL NFR BLD AUTO: 2 %
ERYTHROCYTE [DISTWIDTH] IN BLOOD BY AUTOMATED COUNT: 12.9 % (ref 10–15)
HCT VFR BLD AUTO: 34.5 % (ref 35–47)
HGB BLD-MCNC: 11.2 G/DL (ref 11.7–15.7)
IMM GRANULOCYTES # BLD: 0 10E3/UL
IMM GRANULOCYTES NFR BLD: 0 %
LYMPHOCYTES # BLD AUTO: 1.5 10E3/UL (ref 0.8–5.3)
LYMPHOCYTES NFR BLD AUTO: 25 %
MCH RBC QN AUTO: 29.2 PG (ref 26.5–33)
MCHC RBC AUTO-ENTMCNC: 32.5 G/DL (ref 31.5–36.5)
MCV RBC AUTO: 90 FL (ref 78–100)
MONOCYTES # BLD AUTO: 0.6 10E3/UL (ref 0–1.3)
MONOCYTES NFR BLD AUTO: 9 %
MONOCYTES NFR BLD AUTO: NEGATIVE %
NEUTROPHILS # BLD AUTO: 3.9 10E3/UL (ref 1.6–8.3)
NEUTROPHILS NFR BLD AUTO: 64 %
PLATELET # BLD AUTO: 302 10E3/UL (ref 150–450)
RBC # BLD AUTO: 3.83 10E6/UL (ref 3.8–5.2)
S PYO DNA THROAT QL NAA+PROBE: NOT DETECTED
WBC # BLD AUTO: 6.1 10E3/UL (ref 4–11)

## 2025-03-06 ASSESSMENT — PAIN SCALES - GENERAL: PAINLEVEL_OUTOF10: MODERATE PAIN (6)

## 2025-03-06 NOTE — PROGRESS NOTES
Assessment & Plan     Viral URI with cough      Throat pain    - Streptococcus A Rapid Screen w/Reflex to PCR - Clinic Collect  - Mononucleosis screen  - CBC with platelets and differential  - Mononucleosis screen  - CBC with platelets and differential  - Group A Streptococcus PCR Throat Swab    Left ear pain      Anemia, unspecified type       No ear infection currently    Reviewed negative rapid strep results during visit, PCR testing in process, will notify if positive. Mono negative. Cbc shows no sign of systemic infection but does show anemia. Discussed symptoms likely viral in nature and antibiotic not indicated at this time.     Rest, fluids, tylenol and ibuprofen as needed, nasal saline, humidifier, steam, reducing dairy    Begin daily multivitamin, eat iron rich foods and make appointment with primary care provider for further evaluation of anemia     Follow-up with PCP if symptoms persist for 7 days, and sooner if symptoms worsen or new symptoms develop.     Discussed red flag symptoms which warrant immediate visit in emergency room    All questions were answered and patient verbalized understanding. AVS reviewed with patient.     Edilma Gipson, DNP, APRN, CNP 3/6/2025 5:37 PM  [facility] ISIAHOLEG Thrasher is a 30 year old female who presents to clinic today for the following health issues:  Chief Complaint   Patient presents with    Ear Problem     Left ear pain    Pharyngitis     Sore throat since last week (strep negative), cough        Patient presents for evaluation of left ear pain. Associated symptoms: sore throat, cough, fatigue. Thinks she may have had a fever. Denies shortness of breath, wheezing.   Symptoms have been present for 1.5 weeks but ear pain started 2 days ago and is severe.   Took ibuprofen last night which helps temporarily.   She was evaluated in urgent care 2/27/25 when strep was negative and she was prescribed tessalon for viral URI.      Her girlfriend has bronchitis.     Problem list, Medication list, Allergies, and Medical history reviewed in EPIC.    ROS:  Review of systems negative except for noted above        Objective    BP 98/68 (BP Location: Left arm, Patient Position: Sitting, Cuff Size: Adult Regular)   Pulse 92   Temp 97.4  F (36.3  C) (Tympanic)   Resp 18   Wt 56.2 kg (124 lb)   SpO2 99%   BMI 24.22 kg/m    Physical Exam  Constitutional:       General: She is not in acute distress.     Appearance: She is not toxic-appearing or diaphoretic.      Comments: On phone entire visit   HENT:      Head: Normocephalic and atraumatic.      Right Ear: Tympanic membrane, ear canal and external ear normal.      Left Ear: Tympanic membrane, ear canal and external ear normal.      Nose: Congestion present.      Comments: Mild nasal congestion     Mouth/Throat:      Mouth: Mucous membranes are moist.      Pharynx: Oropharynx is clear. Posterior oropharyngeal erythema present. No oropharyngeal exudate.      Comments: Mild oropharyngeal erythema  Cardiovascular:      Rate and Rhythm: Normal rate and regular rhythm.      Heart sounds: Normal heart sounds.   Pulmonary:      Effort: Pulmonary effort is normal. No respiratory distress.      Breath sounds: Normal breath sounds. No wheezing, rhonchi or rales.      Comments: No coughing during exam  Lymphadenopathy:      Cervical: No cervical adenopathy.   Skin:     General: Skin is warm and dry.   Neurological:      Mental Status: She is alert.          Labs:  Results for orders placed or performed in visit on 03/06/25   Mononucleosis screen     Status: Normal   Result Value Ref Range    Mononucleosis Screen Negative Negative   CBC with platelets and differential     Status: Abnormal   Result Value Ref Range    WBC Count 6.1 4.0 - 11.0 10e3/uL    RBC Count 3.83 3.80 - 5.20 10e6/uL    Hemoglobin 11.2 (L) 11.7 - 15.7 g/dL    Hematocrit 34.5 (L) 35.0 - 47.0 %    MCV 90 78 - 100 fL    MCH 29.2 26.5 - 33.0  pg    MCHC 32.5 31.5 - 36.5 g/dL    RDW 12.9 10.0 - 15.0 %    Platelet Count 302 150 - 450 10e3/uL    % Neutrophils 64 %    % Lymphocytes 25 %    % Monocytes 9 %    % Eosinophils 2 %    % Basophils 0 %    % Immature Granulocytes 0 %    Absolute Neutrophils 3.9 1.6 - 8.3 10e3/uL    Absolute Lymphocytes 1.5 0.8 - 5.3 10e3/uL    Absolute Monocytes 0.6 0.0 - 1.3 10e3/uL    Absolute Eosinophils 0.1 0.0 - 0.7 10e3/uL    Absolute Basophils 0.0 0.0 - 0.2 10e3/uL    Absolute Immature Granulocytes 0.0 <=0.4 10e3/uL   Streptococcus A Rapid Screen w/Reflex to PCR - Clinic Collect     Status: Normal    Specimen: Throat; Swab   Result Value Ref Range    Group A Strep antigen Negative Negative   CBC with platelets and differential     Status: Abnormal    Narrative    The following orders were created for panel order CBC with platelets and differential.  Procedure                               Abnormality         Status                     ---------                               -----------         ------                     CBC with platelets and ...[[id number]]  Abnormal            Final result                 Please view results for these tests on the individual orders.

## 2025-03-06 NOTE — PATIENT INSTRUCTIONS
No ear infection currently    Rest, fluids, tylenol and ibuprofen as needed, nasal saline, humidifier, steam, reducing dairy    Begin daily multivitamin and make appointment with primary care provider for further evaluation of anemia

## 2025-04-08 ENCOUNTER — MYC REFILL (OUTPATIENT)
Dept: FAMILY MEDICINE | Facility: CLINIC | Age: 30
End: 2025-04-08

## 2025-04-08 DIAGNOSIS — F33.41 RECURRENT MAJOR DEPRESSIVE DISORDER, IN PARTIAL REMISSION: ICD-10-CM

## 2025-04-14 RX ORDER — ESCITALOPRAM OXALATE 20 MG/1
20 TABLET ORAL
Qty: 30 TABLET | Refills: 0 | Status: SHIPPED | OUTPATIENT
Start: 2025-04-14

## 2025-04-14 NOTE — TELEPHONE ENCOUNTER
Sonia Ruiz, CELIO CNP to [facility] Pool   KT      4/14/25 10:56 AM  Due for OV, was seen in ER for suicidal ideation 2/21/25.

## 2025-05-17 ENCOUNTER — HEALTH MAINTENANCE LETTER (OUTPATIENT)
Age: 30
End: 2025-05-17